# Patient Record
Sex: FEMALE | Race: WHITE | Employment: FULL TIME | URBAN - METROPOLITAN AREA
[De-identification: names, ages, dates, MRNs, and addresses within clinical notes are randomized per-mention and may not be internally consistent; named-entity substitution may affect disease eponyms.]

---

## 2017-04-18 ENCOUNTER — HOSPITAL ENCOUNTER (OUTPATIENT)
Dept: MAMMOGRAPHY | Age: 62
Discharge: HOME | End: 2017-04-18
Admitting: OBSTETRICS & GYNECOLOGY

## 2017-04-18 DIAGNOSIS — Z12.31 ENCOUNTER FOR SCREENING MAMMOGRAM FOR BREAST CANCER: ICD-10-CM

## 2022-01-17 NOTE — PROGRESS NOTES
1/17/22 @ 2224 Pt verbalizes understanding of PAT instructions. Pt had PCR test 1/16/22 at [address] .   She was informed to bring copy DOS.  Quinton Fleischer

## 2022-01-17 NOTE — PROGRESS NOTES
[address] patients having surgery or anesthesia are required to be Covid tested OR to have been vaccinated at least 14 days prior to your procedure. It is very important to return our call to [phone] and notify the staff of your last vaccination date otherwise you will be required to complete Covid PCR test within the 5-6 days prior to surgery & quarantine. The results will need to be faxed to PreAdmission Testing at [phone]. PRIOR TO PROCEDURE DATE:        1. PLEASE FOLLOW ANY  GUIDELINES/ INSTRUCTIONS PRIOR TO YOUR PROCEDURE AS ADVISED BY YOUR SURGEON. 2. Arrange for someone to drive you home and be with you for the first 24 hours after discharge for your safety after your procedure for which you received sedation. Ensure it is someone we can share information with regarding your discharge. 3. You must contact your surgeon for instructions IF:   You are taking any blood thinners, aspirin, anti-inflammatory or vitamin E.   There is a change in your physical condition such as a cold, fever, rash, cuts, sores or any other infection, especially near your surgical site. 4. Do not drink alcohol the day before or day of your procedure. 5. A Pre-op History and Physical for surgery MUST be completed by your Physician or Urgent Care within 30 days of your procedure date. Please bring a copy with you on the day of your procedure and along with any other testing performed. THE DAY OF YOUR PROCEDURE:  1. Follow instructions for ARRIVAL TIME as DIRECTED BY YOUR SURGEON. 2. Enter the MAIN entrance from [employer] and follow the signs to the free [employer] or [employer] parking (offered free of charge 6am-5pm). 3. Enter the Main Entrance of the hospital (do not enter from the lower level of the parking garage). Upon entrance, check in with the  at the main desk on your left. If no one is available at the desk, proceed into the [facility] Waiting Room and go through the door directly into the [facility]. There is a Check-in desk ACROSS from Room 5 (marked with a sign hanging from the ceiling). The phone number for the surgery center is [phone]. 4. Please call [phone] option #2 option #2 if you have not been preregistered yet. On the day of your procedure bring your insurance card and photo ID. You will be registered at your bedside once brought back to your room. 5. DO NOT EAT ANYTHING eight hours prior to your arrival for surgery. May have 8 ounces of water 4 hours prior to your arrival for surgery. NOTE: ALL Gastric, Bariatric and Bowel surgery patients MUST follow their surgeon's instructions. 6. MEDICATIONS    Take the following medications with a SMALL sip of water: none   Bariatric patient's call surgeon if on diabetic medications as some need to be stopped 1 week preop   Use your usual dose of inhalers the morning of surgery. BRING your rescue inhaler with you to hospital.    Anesthesia does NOT want you to take insulin the morning of surgery. They will control your blood sugar while you are at the hospital. Please contact your ordering physician for instructions regarding your insulin the night before your procedure. If you have an insulin pump, please keep it set on basal rate. 7. Do not swallow water when brushing teeth. No gum, candy, mints or ice chips. Refrain from smoking or at least decrease the amount. 8. Dress in loose, comfortable clothing appropriate for redressing after your procedure. Do not wear jewelry (including body piercings), make-up (especially NO eye make-up), fingernail polish (NO toenail polish if foot/leg surgery), lotion, powders or metal hairclips. 9. Dentures, glasses, or contacts will need to be removed before your procedure.  Bring cases for your glasses, contacts, dentures, or hearing aids to protect them while you are in surgery. 10. If you use a CPAP, please bring it with you on the day of your procedure. 11. We recommend that valuable personal  belongings such as cash, cell phones, e-tablets or jewelry, be left at home during your stay. The hospital will not be responsible for valuables that are not secured in the hospital safe. However, if your insurance requires a co-pay, you may want to bring a method of payment, i.e. Check or credit card, if you wish to pay your co-pay the day of surgery. 12. If you are to stay overnight, you may bring a bag with personal items. Please have any large items you may need brought in by your family after your arrival to your hospital room. 15. If you have a Living Will or Durable Power of , please bring a copy on the day of your procedure. 15. With your permission, one family member may accompany you while you are being prepared for surgery. Once you are ready, additional family members may join you. HOW WE KEEP YOU SAFE and WORK TO PREVENT SURGICAL SITE INFECTIONS:  1. Health care workers should always check your ID bracelet to verify your name and birth date. You will be asked many times to state your name, date of birth, and allergies. 2. Health care workers should always clean their hands with soap or alcohol gel before providing care to you. It is okay to ask anyone if they cleaned their hands before they touch you. 3. You will be actively involved in verifying the type of procedure you are having and ensuring the correct surgical site. This will be confirmed multiple times prior to your procedure. Do NOT glenn your surgery site UNLESS instructed to by your surgeon. 4. Do not shave or wax for 72 hours prior to procedure near your operative site. Shaving with a razor can irritate your skin and make it easier to develop an infection.  On the day of your procedure, any hair that needs to be removed near the surgical site will be clipped by a healthcare worker using a special clippers designed to avoid skin irritation. 5. When you are in the operating room, your surgical site will be cleansed with a special soap, and in most cases, you will be given an antibiotic before the surgery begins. What to expect AFTER YOUR PROCEDURE:  1. Immediately following your procedure, your will be taken to the PACU for the first phase of your recovery. Your nurse will help you recover from any potential side effects of anesthesia, such as extreme drowsiness, changes in your vital signs or breathing patterns. Nausea, headache, muscle aches, or sore throat may also occur after anesthesia. Your nurse will help you manage these potential side effects. 2. For comfort and safety, arrange to have someone at home with you for the first 24 hours after discharge. 3. You and your family will be given written instructions about your diet, activity, dressing care, medications, and return visits. 4. Once at home, should issues with nausea, pain, or bleeding occur, or should you notice any signs of infection, you should call your surgeon. 5. Always clean your hands before and after caring for your wound. Do not let your family touch your surgery site without cleaning their hands. 6. Narcotic pain medications can cause significant constipation. You may want to add a stool softener to your postoperative medication schedule or speak to your surgeon on how best to manage this SIDE EFFECT. SPECIAL INSTRUCTIONS none    Thank you for allowing us to care for you. We strive to exceed your expectations in the delivery of care and service provided to you and your family. If you need to contact the [place] [address] staff for any reason, please call us at [phone]    Instructions reviewed with patient during preadmission testing phone interview.   Estefanía Marinelli RN.1/17/2022 .1:38 PM      ADDITIONAL EDUCATIONAL INFORMATION REVIEWED PER PHONE WITH YOU AND/OR YOUR FAMILY:  Yes Hibiclens® Bathing Instructions   Yes Antibacterial Soap

## 2022-01-20 ENCOUNTER — ANESTHESIA EVENT (OUTPATIENT)
Dept: OPERATING ROOM | Age: 67
End: 2022-01-20
Payer: MEDICARE

## 2022-01-20 NOTE — PROGRESS NOTES
Place patient label insidbox (if no patient label, complete below)  Name:  :  MR#:   Michelle Betancourt / PROCEDURE  I (we), Loi Kenneth  (Patient Name) authorize DR. Sophia Fragoso  (Provider / Jerry Yap) and/or such assistants as may be selected by him/her, to perform the following operation/procedure(s): RIGHT SUBTALAR JOINT ARTHRODESIS, THOMPSON PROCEDURE, TALAR NAVICULAR CUNEIFORM ARTHRODESIS, CALCANEAL OSTEOTOMY, MONDRAGON PROCEDURE, OPEN REDUCTION AND INTERNAL FIXATION ;   RIGHT OPEN GASTROCNEMIUS RECESSION            Note: If unable to obtain consent prior to an emergent procedure, document the emergent reason in the medical record. This procedure has been explained to my (our) satisfaction and included in the explanation was:  A) The intended benefit, nature, and extent of the procedure to be performed;  B) The significant risks involved and the probability of success;  C) Alternative procedures and methods of treatment;  D) The dangers and probable consequences of such alternatives (including no procedure or treatment); E) The expected consequences of the procedure on my future health;  F) Whether other qualified individuals would be performing important surgical tasks and/or whether  would be present to advise or support the procedure. I (we) understand that there are other risks of infection and other serious complications in the pre-operative/procedural and postoperative/procedural stages of my (our) care. I (we) have asked all of the questions which I (we) thought were important in deciding whether or not to undergo treatment or diagnosis. These questions have been answered to my (our) satisfaction. I (we) understand that no assurance can be given that the procedure will be a success, and no guarantee or warranty of success has been given to me (us).     1. It has been explained to me (us) that during the course of the operation/procedure, unforeseen conditions may be revealed that necessitate extension of the original procedure(s) or different procedure(s) than those set forth in Paragraph 1. I (we) authorize and request that the above-named physician, his/her assistants or his/her designees, perform procedures as necessary and desirable if deemed to be in my (our) best interest.     Revised 8/2/2021                                                                          Page 1 of 2       2. I acknowledge that health care personnel may be observing this procedure for the purpose of medical education or other specified purposes as may be necessary as requested and/or approved by my (our) physician. 3. I (we) consent to the disposal by the hospital Pathologist of the removed tissue, parts or organs in accordance with hospital policy. 4. I do ____ do not ____ consent to the use of a local infiltration pain blocking agent that will be used by my provider/surgical provider to help alleviate pain during my procedure. 5. I do ____ do not ____ consent to an emergent blood transfusion in the case of a life-threatening situation that requires blood components to be administered. This consent is valid for 24 hours from the beginning of the procedure. 6. This patient does ____ or does not ____ currently have a DNR status/order. If DNR order is in place, obtain Addendum to the Surgical Consent for ALL Patients with a DNR Order to address lydia-operative status for limited intervention or DNR suspension.      7. I have read and fully understand the above Consent for Operation/Procedure and that all blanks were completed before I signed the consent.   _____________________________       _____________________      ____/____am/pm  Signature of Patient or legal representative      Printed Name / Relationship            Date / Time   ____________________________       _____________________      ____/____am/pm  Witness to Signature Printed Name                    Date / Time     If patient is unable to sign or is a minor, complete the following)  Patient is a minor, ____ years of age, or unable to sign because:   ______________________________________________________________________________________________    Valdivia Saliva If a phone consent is obtained, consent will be documented by using two health care professionals, each affirming that the consenting party has no questions and gives consent for the procedure discussed with the physician/provider.   _____________________          ____________________       _____/_____am/pm   2nd witness to phone consent        Printed name           Date / Time    Informed Consent:  I have provided the explanation described above in section 1 to the patient and/or legal representative.  I have provided the patient and/or legal representative with an opportunity to ask any questions about the proposed operation/procedure.   ___________________________          ____________________         ____/____am/pm  Provider / Proceduralist                            Printed name            Date / Time  Revised 8/2/2021                                                                      Page 2 of 2

## 2022-01-21 ENCOUNTER — ANESTHESIA (OUTPATIENT)
Dept: OPERATING ROOM | Age: 67
End: 2022-01-21
Payer: MEDICARE

## 2022-01-27 ENCOUNTER — HOSPITAL ENCOUNTER (OUTPATIENT)
Age: 67
Setting detail: OBSERVATION
Discharge: ANOTHER HOSPITAL | End: 2022-01-28
Attending: PODIATRIST | Admitting: PODIATRIST
Payer: MEDICARE

## 2022-01-27 ENCOUNTER — APPOINTMENT (OUTPATIENT)
Dept: GENERAL RADIOLOGY | Age: 67
End: 2022-01-27
Attending: PODIATRIST
Payer: MEDICARE

## 2022-01-27 VITALS
TEMPERATURE: 96.3 F | SYSTOLIC BLOOD PRESSURE: 112 MMHG | DIASTOLIC BLOOD PRESSURE: 59 MMHG | RESPIRATION RATE: 12 BRPM | OXYGEN SATURATION: 100 %

## 2022-01-27 DIAGNOSIS — G89.18 POST-OP PAIN: Primary | ICD-10-CM

## 2022-01-27 PROBLEM — R26.2 AMBULATORY DYSFUNCTION: Status: ACTIVE | Noted: 2022-01-27

## 2022-01-27 PROCEDURE — 3700000001 HC ADD 15 MINUTES (ANESTHESIA): Performed by: PODIATRIST

## 2022-01-27 PROCEDURE — C1894 INTRO/SHEATH, NON-LASER: HCPCS | Performed by: PODIATRIST

## 2022-01-27 PROCEDURE — 2580000003 HC RX 258: Performed by: ANESTHESIOLOGY

## 2022-01-27 PROCEDURE — 64447 NJX AA&/STRD FEMORAL NRV IMG: CPT | Performed by: ANESTHESIOLOGY

## 2022-01-27 PROCEDURE — 6360000002 HC RX W HCPCS: Performed by: PODIATRIST

## 2022-01-27 PROCEDURE — 6360000002 HC RX W HCPCS: Performed by: ANESTHESIOLOGY

## 2022-01-27 PROCEDURE — 2580000003 HC RX 258: Performed by: PODIATRIST

## 2022-01-27 PROCEDURE — 2500000003 HC RX 250 WO HCPCS: Performed by: PODIATRIST

## 2022-01-27 PROCEDURE — A4217 STERILE WATER/SALINE, 500 ML: HCPCS | Performed by: PODIATRIST

## 2022-01-27 PROCEDURE — 3600000014 HC SURGERY LEVEL 4 ADDTL 15MIN: Performed by: PODIATRIST

## 2022-01-27 PROCEDURE — 6370000000 HC RX 637 (ALT 250 FOR IP): Performed by: PODIATRIST

## 2022-01-27 PROCEDURE — 2720000010 HC SURG SUPPLY STERILE: Performed by: PODIATRIST

## 2022-01-27 PROCEDURE — C1889 IMPLANT/INSERT DEVICE, NOC: HCPCS | Performed by: PODIATRIST

## 2022-01-27 PROCEDURE — 2500000003 HC RX 250 WO HCPCS: Performed by: NURSE ANESTHETIST, CERTIFIED REGISTERED

## 2022-01-27 PROCEDURE — 7100000001 HC PACU RECOVERY - ADDTL 15 MIN: Performed by: PODIATRIST

## 2022-01-27 PROCEDURE — 2500000003 HC RX 250 WO HCPCS: Performed by: ANESTHESIOLOGY

## 2022-01-27 PROCEDURE — 2709999900 HC NON-CHARGEABLE SUPPLY: Performed by: PODIATRIST

## 2022-01-27 PROCEDURE — 3600000004 HC SURGERY LEVEL 4 BASE: Performed by: PODIATRIST

## 2022-01-27 PROCEDURE — 7100000000 HC PACU RECOVERY - FIRST 15 MIN: Performed by: PODIATRIST

## 2022-01-27 PROCEDURE — C1769 GUIDE WIRE: HCPCS | Performed by: PODIATRIST

## 2022-01-27 PROCEDURE — 64445 NJX AA&/STRD SCIATIC NRV IMG: CPT | Performed by: ANESTHESIOLOGY

## 2022-01-27 PROCEDURE — C1713 ANCHOR/SCREW BN/BN,TIS/BN: HCPCS | Performed by: PODIATRIST

## 2022-01-27 PROCEDURE — 3700000000 HC ANESTHESIA ATTENDED CARE: Performed by: PODIATRIST

## 2022-01-27 PROCEDURE — 73630 X-RAY EXAM OF FOOT: CPT

## 2022-01-27 PROCEDURE — C1762 CONN TISS, HUMAN(INC FASCIA): HCPCS | Performed by: PODIATRIST

## 2022-01-27 PROCEDURE — 6360000002 HC RX W HCPCS: Performed by: NURSE ANESTHETIST, CERTIFIED REGISTERED

## 2022-01-27 PROCEDURE — G0378 HOSPITAL OBSERVATION PER HR: HCPCS

## 2022-01-27 DEVICE — SCREW BNE L70MM DIA7MM XL HDLSS COMPR FULL THRD: Type: IMPLANTABLE DEVICE | Site: FOOT | Status: FUNCTIONAL

## 2022-01-27 DEVICE — ALLOSYNC DBM GEL, 5CC SYRINGE
Type: IMPLANTABLE DEVICE | Site: FOOT | Status: FUNCTIONAL
Brand: ARTHREX

## 2022-01-27 DEVICE — IMPLANTABLE DEVICE: Type: IMPLANTABLE DEVICE | Site: FOOT | Status: FUNCTIONAL

## 2022-01-27 DEVICE — STAPLE BONE FIX W20XL20MM NIT COMPR W/ INSTRMT LO PROF FOR: Type: IMPLANTABLE DEVICE | Site: FOOT | Status: FUNCTIONAL

## 2022-01-27 DEVICE — ALLOSYNC CB DBM PUTTY, 5CC VIAL
Type: IMPLANTABLE DEVICE | Site: FOOT | Status: FUNCTIONAL
Brand: ARTHREX

## 2022-01-27 RX ORDER — SUCCINYLCHOLINE CHLORIDE 20 MG/ML
INJECTION INTRAMUSCULAR; INTRAVENOUS PRN
Status: DISCONTINUED | OUTPATIENT
Start: 2022-01-27 | End: 2022-01-27 | Stop reason: SDUPTHER

## 2022-01-27 RX ORDER — POLYETHYLENE GLYCOL 3350 17 G/17G
17 POWDER, FOR SOLUTION ORAL DAILY PRN
Status: DISCONTINUED | OUTPATIENT
Start: 2022-01-27 | End: 2022-01-28 | Stop reason: HOSPADM

## 2022-01-27 RX ORDER — SODIUM CHLORIDE, SODIUM LACTATE, POTASSIUM CHLORIDE, CALCIUM CHLORIDE 600; 310; 30; 20 MG/100ML; MG/100ML; MG/100ML; MG/100ML
INJECTION, SOLUTION INTRAVENOUS CONTINUOUS
Status: DISCONTINUED | OUTPATIENT
Start: 2022-01-27 | End: 2022-01-28 | Stop reason: HOSPADM

## 2022-01-27 RX ORDER — MIDAZOLAM HYDROCHLORIDE 1 MG/ML
INJECTION INTRAMUSCULAR; INTRAVENOUS PRN
Status: DISCONTINUED | OUTPATIENT
Start: 2022-01-27 | End: 2022-01-27 | Stop reason: SDUPTHER

## 2022-01-27 RX ORDER — MEPERIDINE HYDROCHLORIDE 25 MG/ML
12.5 INJECTION INTRAMUSCULAR; INTRAVENOUS; SUBCUTANEOUS EVERY 5 MIN PRN
Status: DISCONTINUED | OUTPATIENT
Start: 2022-01-27 | End: 2022-01-27 | Stop reason: HOSPADM

## 2022-01-27 RX ORDER — SODIUM CHLORIDE 9 MG/ML
25 INJECTION, SOLUTION INTRAVENOUS PRN
Status: DISCONTINUED | OUTPATIENT
Start: 2022-01-27 | End: 2022-01-28 | Stop reason: HOSPADM

## 2022-01-27 RX ORDER — ONDANSETRON 2 MG/ML
4 INJECTION INTRAMUSCULAR; INTRAVENOUS EVERY 6 HOURS PRN
Status: DISCONTINUED | OUTPATIENT
Start: 2022-01-27 | End: 2022-01-28 | Stop reason: HOSPADM

## 2022-01-27 RX ORDER — ONDANSETRON 2 MG/ML
4 INJECTION INTRAMUSCULAR; INTRAVENOUS
Status: DISCONTINUED | OUTPATIENT
Start: 2022-01-27 | End: 2022-01-27 | Stop reason: HOSPADM

## 2022-01-27 RX ORDER — FENTANYL CITRATE 50 UG/ML
INJECTION, SOLUTION INTRAMUSCULAR; INTRAVENOUS PRN
Status: DISCONTINUED | OUTPATIENT
Start: 2022-01-27 | End: 2022-01-27 | Stop reason: SDUPTHER

## 2022-01-27 RX ORDER — GLYCOPYRROLATE 0.2 MG/ML
INJECTION INTRAMUSCULAR; INTRAVENOUS PRN
Status: DISCONTINUED | OUTPATIENT
Start: 2022-01-27 | End: 2022-01-27

## 2022-01-27 RX ORDER — ONDANSETRON 4 MG/1
4 TABLET, ORALLY DISINTEGRATING ORAL EVERY 8 HOURS PRN
Status: DISCONTINUED | OUTPATIENT
Start: 2022-01-27 | End: 2022-01-28 | Stop reason: HOSPADM

## 2022-01-27 RX ORDER — PROPOFOL 10 MG/ML
INJECTION, EMULSION INTRAVENOUS PRN
Status: DISCONTINUED | OUTPATIENT
Start: 2022-01-27 | End: 2022-01-27 | Stop reason: SDUPTHER

## 2022-01-27 RX ORDER — OXYCODONE HYDROCHLORIDE 5 MG/1
5 TABLET ORAL EVERY 4 HOURS PRN
Status: DISCONTINUED | OUTPATIENT
Start: 2022-01-27 | End: 2022-01-28 | Stop reason: HOSPADM

## 2022-01-27 RX ORDER — DEXAMETHASONE SODIUM PHOSPHATE 4 MG/ML
INJECTION, SOLUTION INTRA-ARTICULAR; INTRALESIONAL; INTRAMUSCULAR; INTRAVENOUS; SOFT TISSUE PRN
Status: DISCONTINUED | OUTPATIENT
Start: 2022-01-27 | End: 2022-01-27 | Stop reason: SDUPTHER

## 2022-01-27 RX ORDER — KETOROLAC TROMETHAMINE 15 MG/ML
INJECTION, SOLUTION INTRAMUSCULAR; INTRAVENOUS PRN
Status: DISCONTINUED | OUTPATIENT
Start: 2022-01-27 | End: 2022-01-27 | Stop reason: SDUPTHER

## 2022-01-27 RX ORDER — FENTANYL CITRATE 50 UG/ML
25 INJECTION, SOLUTION INTRAMUSCULAR; INTRAVENOUS EVERY 5 MIN PRN
Status: DISCONTINUED | OUTPATIENT
Start: 2022-01-27 | End: 2022-01-27 | Stop reason: HOSPADM

## 2022-01-27 RX ORDER — LIDOCAINE HYDROCHLORIDE 20 MG/ML
INJECTION, SOLUTION INFILTRATION; PERINEURAL PRN
Status: DISCONTINUED | OUTPATIENT
Start: 2022-01-27 | End: 2022-01-27 | Stop reason: SDUPTHER

## 2022-01-27 RX ORDER — OXYCODONE HYDROCHLORIDE AND ACETAMINOPHEN 5; 325 MG/1; MG/1
1 TABLET ORAL
Status: DISCONTINUED | OUTPATIENT
Start: 2022-01-27 | End: 2022-01-27 | Stop reason: HOSPADM

## 2022-01-27 RX ORDER — HYDRALAZINE HYDROCHLORIDE 20 MG/ML
5 INJECTION INTRAMUSCULAR; INTRAVENOUS EVERY 10 MIN PRN
Status: DISCONTINUED | OUTPATIENT
Start: 2022-01-27 | End: 2022-01-27 | Stop reason: HOSPADM

## 2022-01-27 RX ORDER — SODIUM CHLORIDE 0.9 % (FLUSH) 0.9 %
5-40 SYRINGE (ML) INJECTION PRN
Status: DISCONTINUED | OUTPATIENT
Start: 2022-01-27 | End: 2022-01-28 | Stop reason: HOSPADM

## 2022-01-27 RX ORDER — ONDANSETRON 2 MG/ML
INJECTION INTRAMUSCULAR; INTRAVENOUS PRN
Status: DISCONTINUED | OUTPATIENT
Start: 2022-01-27 | End: 2022-01-27 | Stop reason: SDUPTHER

## 2022-01-27 RX ORDER — BUPIVACAINE HYDROCHLORIDE 5 MG/ML
INJECTION, SOLUTION EPIDURAL; INTRACAUDAL PRN
Status: DISCONTINUED | OUTPATIENT
Start: 2022-01-27 | End: 2022-01-27 | Stop reason: SDUPTHER

## 2022-01-27 RX ORDER — LABETALOL HYDROCHLORIDE 5 MG/ML
5 INJECTION, SOLUTION INTRAVENOUS EVERY 10 MIN PRN
Status: DISCONTINUED | OUTPATIENT
Start: 2022-01-27 | End: 2022-01-27 | Stop reason: HOSPADM

## 2022-01-27 RX ORDER — GLYCOPYRROLATE 0.2 MG/ML
INJECTION INTRAMUSCULAR; INTRAVENOUS PRN
Status: DISCONTINUED | OUTPATIENT
Start: 2022-01-27 | End: 2022-01-27 | Stop reason: SDUPTHER

## 2022-01-27 RX ORDER — ROPIVACAINE HYDROCHLORIDE 5 MG/ML
INJECTION, SOLUTION EPIDURAL; INFILTRATION; PERINEURAL PRN
Status: DISCONTINUED | OUTPATIENT
Start: 2022-01-27 | End: 2022-01-27 | Stop reason: SDUPTHER

## 2022-01-27 RX ORDER — KETAMINE HYDROCHLORIDE 50 MG/ML
INJECTION, SOLUTION, CONCENTRATE INTRAMUSCULAR; INTRAVENOUS PRN
Status: DISCONTINUED | OUTPATIENT
Start: 2022-01-27 | End: 2022-01-27 | Stop reason: SDUPTHER

## 2022-01-27 RX ORDER — SODIUM CHLORIDE 0.9 % (FLUSH) 0.9 %
5-40 SYRINGE (ML) INJECTION EVERY 12 HOURS SCHEDULED
Status: DISCONTINUED | OUTPATIENT
Start: 2022-01-27 | End: 2022-01-28 | Stop reason: HOSPADM

## 2022-01-27 RX ORDER — ACETAMINOPHEN 650 MG/1
650 SUPPOSITORY RECTAL EVERY 6 HOURS PRN
Status: DISCONTINUED | OUTPATIENT
Start: 2022-01-27 | End: 2022-01-28 | Stop reason: HOSPADM

## 2022-01-27 RX ORDER — BUPIVACAINE HYDROCHLORIDE 5 MG/ML
INJECTION, SOLUTION EPIDURAL; INTRACAUDAL PRN
Status: DISCONTINUED | OUTPATIENT
Start: 2022-01-27 | End: 2022-01-27 | Stop reason: HOSPADM

## 2022-01-27 RX ORDER — PROMETHAZINE HYDROCHLORIDE 25 MG/ML
6.25 INJECTION, SOLUTION INTRAMUSCULAR; INTRAVENOUS
Status: DISCONTINUED | OUTPATIENT
Start: 2022-01-27 | End: 2022-01-27 | Stop reason: HOSPADM

## 2022-01-27 RX ORDER — ROCURONIUM BROMIDE 10 MG/ML
INJECTION, SOLUTION INTRAVENOUS PRN
Status: DISCONTINUED | OUTPATIENT
Start: 2022-01-27 | End: 2022-01-27 | Stop reason: SDUPTHER

## 2022-01-27 RX ORDER — CITALOPRAM 20 MG/1
20 TABLET ORAL DAILY
Status: DISCONTINUED | OUTPATIENT
Start: 2022-01-28 | End: 2022-01-28 | Stop reason: HOSPADM

## 2022-01-27 RX ORDER — ACETAMINOPHEN 325 MG/1
650 TABLET ORAL EVERY 6 HOURS PRN
Status: DISCONTINUED | OUTPATIENT
Start: 2022-01-27 | End: 2022-01-28 | Stop reason: HOSPADM

## 2022-01-27 RX ORDER — OXYCODONE HYDROCHLORIDE 5 MG/1
10 TABLET ORAL EVERY 4 HOURS PRN
Status: DISCONTINUED | OUTPATIENT
Start: 2022-01-27 | End: 2022-01-28 | Stop reason: HOSPADM

## 2022-01-27 RX ORDER — MIDAZOLAM HYDROCHLORIDE 1 MG/ML
INJECTION INTRAMUSCULAR; INTRAVENOUS
Status: COMPLETED
Start: 2022-01-27 | End: 2022-01-27

## 2022-01-27 RX ORDER — BUPIVACAINE HYDROCHLORIDE 5 MG/ML
INJECTION, SOLUTION EPIDURAL; INTRACAUDAL
Status: COMPLETED
Start: 2022-01-27 | End: 2022-01-27

## 2022-01-27 RX ORDER — POVIDONE-IODINE 10 MG/G
OINTMENT TOPICAL PRN
Status: DISCONTINUED | OUTPATIENT
Start: 2022-01-27 | End: 2022-01-27 | Stop reason: HOSPADM

## 2022-01-27 RX ADMIN — FENTANYL CITRATE 25 MCG: 50 INJECTION, SOLUTION INTRAMUSCULAR; INTRAVENOUS at 11:13

## 2022-01-27 RX ADMIN — GLYCOPYRROLATE 0.4 MG: 0.2 INJECTION INTRAMUSCULAR; INTRAVENOUS at 11:10

## 2022-01-27 RX ADMIN — MIDAZOLAM HYDROCHLORIDE 2 MG: 1 INJECTION INTRAMUSCULAR; INTRAVENOUS at 11:05

## 2022-01-27 RX ADMIN — PHENYLEPHRINE HYDROCHLORIDE 50 MCG: 10 INJECTION, SOLUTION INTRAMUSCULAR; INTRAVENOUS; SUBCUTANEOUS at 11:11

## 2022-01-27 RX ADMIN — SODIUM CHLORIDE, SODIUM LACTATE, POTASSIUM CHLORIDE, AND CALCIUM CHLORIDE: .6; .31; .03; .02 INJECTION, SOLUTION INTRAVENOUS at 10:10

## 2022-01-27 RX ADMIN — PHENYLEPHRINE HYDROCHLORIDE 50 MCG: 10 INJECTION, SOLUTION INTRAMUSCULAR; INTRAVENOUS; SUBCUTANEOUS at 12:09

## 2022-01-27 RX ADMIN — PROPOFOL 50 MG: 10 INJECTION, EMULSION INTRAVENOUS at 11:13

## 2022-01-27 RX ADMIN — PHENYLEPHRINE HYDROCHLORIDE 50 MCG: 10 INJECTION, SOLUTION INTRAMUSCULAR; INTRAVENOUS; SUBCUTANEOUS at 13:35

## 2022-01-27 RX ADMIN — KETAMINE HYDROCHLORIDE 10 MG: 50 INJECTION, SOLUTION INTRAMUSCULAR; INTRAVENOUS at 12:18

## 2022-01-27 RX ADMIN — LIDOCAINE HYDROCHLORIDE 100 MG: 20 INJECTION, SOLUTION INFILTRATION; PERINEURAL at 11:11

## 2022-01-27 RX ADMIN — ROCURONIUM BROMIDE 10 MG: 10 INJECTION INTRAVENOUS at 11:12

## 2022-01-27 RX ADMIN — SODIUM CHLORIDE, PRESERVATIVE FREE 10 ML: 5 INJECTION INTRAVENOUS at 20:43

## 2022-01-27 RX ADMIN — ONDANSETRON 4 MG: 2 INJECTION INTRAMUSCULAR; INTRAVENOUS at 15:53

## 2022-01-27 RX ADMIN — KETAMINE HYDROCHLORIDE 10 MG: 50 INJECTION, SOLUTION INTRAMUSCULAR; INTRAVENOUS at 11:31

## 2022-01-27 RX ADMIN — FENTANYL CITRATE 25 MCG: 50 INJECTION, SOLUTION INTRAMUSCULAR; INTRAVENOUS at 11:33

## 2022-01-27 RX ADMIN — ONDANSETRON 4 MG: 2 INJECTION INTRAMUSCULAR; INTRAVENOUS at 11:24

## 2022-01-27 RX ADMIN — FENTANYL CITRATE 25 MCG: 50 INJECTION, SOLUTION INTRAMUSCULAR; INTRAVENOUS at 13:50

## 2022-01-27 RX ADMIN — KETOROLAC TROMETHAMINE 15 MG: 15 INJECTION, SOLUTION INTRAMUSCULAR; INTRAVENOUS at 13:54

## 2022-01-27 RX ADMIN — FENTANYL CITRATE 25 MCG: 50 INJECTION, SOLUTION INTRAMUSCULAR; INTRAVENOUS at 13:36

## 2022-01-27 RX ADMIN — SUCCINYLCHOLINE CHLORIDE 100 MG: 20 INJECTION, SOLUTION INTRAMUSCULAR; INTRAVENOUS; PARENTERAL at 11:14

## 2022-01-27 RX ADMIN — KETAMINE HYDROCHLORIDE 10 MG: 50 INJECTION, SOLUTION INTRAMUSCULAR; INTRAVENOUS at 13:00

## 2022-01-27 RX ADMIN — DEXAMETHASONE SODIUM PHOSPHATE 4 MG: 4 INJECTION, SOLUTION INTRAMUSCULAR; INTRAVENOUS at 11:20

## 2022-01-27 RX ADMIN — ROPIVACAINE HYDROCHLORIDE 20 ML: 5 INJECTION, SOLUTION EPIDURAL; INFILTRATION; PERINEURAL at 10:49

## 2022-01-27 RX ADMIN — MIDAZOLAM HYDROCHLORIDE 2 MG: 2 INJECTION, SOLUTION INTRAMUSCULAR; INTRAVENOUS at 10:43

## 2022-01-27 RX ADMIN — HYDROMORPHONE HYDROCHLORIDE 0.5 MG: 1 INJECTION, SOLUTION INTRAMUSCULAR; INTRAVENOUS; SUBCUTANEOUS at 15:53

## 2022-01-27 RX ADMIN — FAMOTIDINE 20 MG: 10 INJECTION, SOLUTION INTRAVENOUS at 10:56

## 2022-01-27 RX ADMIN — BUPIVACAINE HYDROCHLORIDE 30 ML: 5 INJECTION, SOLUTION EPIDURAL; INTRACAUDAL; PERINEURAL at 10:48

## 2022-01-27 ASSESSMENT — PULMONARY FUNCTION TESTS
PIF_VALUE: 21
PIF_VALUE: 20
PIF_VALUE: 21
PIF_VALUE: 31
PIF_VALUE: 21
PIF_VALUE: 22
PIF_VALUE: 21
PIF_VALUE: 20
PIF_VALUE: 12
PIF_VALUE: 21
PIF_VALUE: 20
PIF_VALUE: 20
PIF_VALUE: 21
PIF_VALUE: 20
PIF_VALUE: 5
PIF_VALUE: 22
PIF_VALUE: 21
PIF_VALUE: 20
PIF_VALUE: 22
PIF_VALUE: 12
PIF_VALUE: 21
PIF_VALUE: 3
PIF_VALUE: 20
PIF_VALUE: 23
PIF_VALUE: 2
PIF_VALUE: 19
PIF_VALUE: 20
PIF_VALUE: 21
PIF_VALUE: 19
PIF_VALUE: 22
PIF_VALUE: 21
PIF_VALUE: 21
PIF_VALUE: 20
PIF_VALUE: 2
PIF_VALUE: 21
PIF_VALUE: 22
PIF_VALUE: 21
PIF_VALUE: 2
PIF_VALUE: 12
PIF_VALUE: 2
PIF_VALUE: 21
PIF_VALUE: 12
PIF_VALUE: 21
PIF_VALUE: 21
PIF_VALUE: 12
PIF_VALUE: 21
PIF_VALUE: 2
PIF_VALUE: 2
PIF_VALUE: 21
PIF_VALUE: 22
PIF_VALUE: 21
PIF_VALUE: 22
PIF_VALUE: 2
PIF_VALUE: 21
PIF_VALUE: 21
PIF_VALUE: 12
PIF_VALUE: 3
PIF_VALUE: 21
PIF_VALUE: 18
PIF_VALUE: 2
PIF_VALUE: 4
PIF_VALUE: 21
PIF_VALUE: 20
PIF_VALUE: 2
PIF_VALUE: 1
PIF_VALUE: 21
PIF_VALUE: 2
PIF_VALUE: 21
PIF_VALUE: 20
PIF_VALUE: 22
PIF_VALUE: 20
PIF_VALUE: 21
PIF_VALUE: 20
PIF_VALUE: 3
PIF_VALUE: 22
PIF_VALUE: 12
PIF_VALUE: 21
PIF_VALUE: 20
PIF_VALUE: 21
PIF_VALUE: 20
PIF_VALUE: 21
PIF_VALUE: 20
PIF_VALUE: 21
PIF_VALUE: 19
PIF_VALUE: 20
PIF_VALUE: 21
PIF_VALUE: 21
PIF_VALUE: 22
PIF_VALUE: 20
PIF_VALUE: 21
PIF_VALUE: 21
PIF_VALUE: 22
PIF_VALUE: 21
PIF_VALUE: 4
PIF_VALUE: 2
PIF_VALUE: 2
PIF_VALUE: 21
PIF_VALUE: 20
PIF_VALUE: 2
PIF_VALUE: 12
PIF_VALUE: 2
PIF_VALUE: 22
PIF_VALUE: 21
PIF_VALUE: 21
PIF_VALUE: 2
PIF_VALUE: 21
PIF_VALUE: 21
PIF_VALUE: 2
PIF_VALUE: 20
PIF_VALUE: 21
PIF_VALUE: 12
PIF_VALUE: 21
PIF_VALUE: 12
PIF_VALUE: 20
PIF_VALUE: 21
PIF_VALUE: 21
PIF_VALUE: 2
PIF_VALUE: 21
PIF_VALUE: 2
PIF_VALUE: 22
PIF_VALUE: 20
PIF_VALUE: 19
PIF_VALUE: 1
PIF_VALUE: 12
PIF_VALUE: 20
PIF_VALUE: 21
PIF_VALUE: 20
PIF_VALUE: 21
PIF_VALUE: 21
PIF_VALUE: 20
PIF_VALUE: 2
PIF_VALUE: 2
PIF_VALUE: 12
PIF_VALUE: 21
PIF_VALUE: 21
PIF_VALUE: 17
PIF_VALUE: 21
PIF_VALUE: 22
PIF_VALUE: 22
PIF_VALUE: 20
PIF_VALUE: 12
PIF_VALUE: 21
PIF_VALUE: 2
PIF_VALUE: 20
PIF_VALUE: 22
PIF_VALUE: 21
PIF_VALUE: 22
PIF_VALUE: 20
PIF_VALUE: 21
PIF_VALUE: 22
PIF_VALUE: 5
PIF_VALUE: 12
PIF_VALUE: 21
PIF_VALUE: 21
PIF_VALUE: 20
PIF_VALUE: 21
PIF_VALUE: 21
PIF_VALUE: 20
PIF_VALUE: 21
PIF_VALUE: 21
PIF_VALUE: 25
PIF_VALUE: 21
PIF_VALUE: 21
PIF_VALUE: 2
PIF_VALUE: 3
PIF_VALUE: 20

## 2022-01-27 ASSESSMENT — PAIN DESCRIPTION - LOCATION: LOCATION: FOOT

## 2022-01-27 ASSESSMENT — PAIN DESCRIPTION - PROGRESSION: CLINICAL_PROGRESSION: GRADUALLY WORSENING

## 2022-01-27 ASSESSMENT — PAIN SCALES - GENERAL
PAINLEVEL_OUTOF10: 0
PAINLEVEL_OUTOF10: 2
PAINLEVEL_OUTOF10: 7
PAINLEVEL_OUTOF10: 0

## 2022-01-27 ASSESSMENT — PAIN DESCRIPTION - DESCRIPTORS
DESCRIPTORS: ACHING;BURNING
DESCRIPTORS: ACHING

## 2022-01-27 ASSESSMENT — PAIN DESCRIPTION - FREQUENCY: FREQUENCY: CONTINUOUS

## 2022-01-27 ASSESSMENT — PAIN - FUNCTIONAL ASSESSMENT
PAIN_FUNCTIONAL_ASSESSMENT: PREVENTS OR INTERFERES SOME ACTIVE ACTIVITIES AND ADLS
PAIN_FUNCTIONAL_ASSESSMENT: 0-10

## 2022-01-27 ASSESSMENT — PAIN DESCRIPTION - ONSET: ONSET: GRADUAL

## 2022-01-27 ASSESSMENT — PAIN DESCRIPTION - PAIN TYPE: TYPE: SURGICAL PAIN

## 2022-01-27 ASSESSMENT — PAIN DESCRIPTION - ORIENTATION: ORIENTATION: RIGHT

## 2022-01-27 NOTE — PROGRESS NOTES
PACU Transfer Note    PACU Transfer Note    Vitals:    01/27/22 1700   BP: (!) 146/81   Pulse: 81   Resp: 19   Temp: 97.5 °F (36.4 °C)   SpO2: 100%       In: 1600 [I.V.:1600]  Out: 150 [Urine:100]    Pain assessment:    Pain Level: 2    Report given to Receiving unit RN.    1/27/2022 5:31 PM

## 2022-01-27 NOTE — ANESTHESIA POSTPROCEDURE EVALUATION
Department of Anesthesiology  Postprocedure Note    Patient: Randal Bailey  MRN: [id number]  YOB: 1955  Date of evaluation: 1/27/2022  Time:  4:31 PM     Procedure Summary     Date: 01/27/22 Room / Location: [facility] [address]  / [facility]    Anesthesia Start: 1106 Anesthesia Stop: 2404    Procedures:       RIGHT SUBTALAR JOINT ARTHRODESIS, THOMPSON PROCEDURE, TALAR NAVICULAR CUNEIFORM ARTHRODESIS, CALCANEAL OSTEOTOMY, MONDRAGON PROCEDURE, OPEN REDUCTION AND INTERNAL FIXATION (Right )      RIGHT OPEN GASTROCNEMIUS RECESSION (Right )      . (Right ) Diagnosis:       Secondary osteoarthritis of ankle, right      Posterior tibial tendinitis of right leg      Major osseous defect, right ankle and foot      Subluxation of tarsometatarsal joint of right foot, initial encounter      Posterior tibial tendonitis, right      (osteoarthritis of ankle, right [M19.271], Posterior tibial tendinitis of right leg [M76.821] Tightened flexors / equimus. Osseous defects tarsal, Subluxation / Dislocation of midfoot)    Surgeons: Maria Luisa Kessler DPM Responsible Provider:     Anesthesia Type: general ASA Status: 2          Anesthesia Type: general    Waylon Phase I: Waylon Score: 4    Waylon Phase II:      Last vitals: Reviewed and per EMR flowsheets.        Anesthesia Post Evaluation    Patient location during evaluation: PACU  Patient participation: complete - patient participated  Level of consciousness: awake and alert  Airway patency: patent  Nausea & Vomiting: no nausea and no vomiting  Complications: no  Cardiovascular status: hemodynamically stable  Respiratory status: acceptable  Hydration status: euvolemic

## 2022-01-27 NOTE — H&P
[address]    [phone]    [facility] Same Day Surgery Update H & P  Department of General Surgery   Surgical Service   Pre-operative History and Physical  Last H & P within the last 30 days. DIAGNOSIS:   Secondary osteoarthritis of ankle, right [M19.271]  Posterior tibial tendinitis of right leg [M76.821]  Major osseous defect, right ankle and foot [M89.771]  Subluxation of tarsometatarsal joint of right foot, initial encounter [S93.321A]  Posterior tibial tendonitis, right [M96.834]    Procedure(s):  RIGHT SUBTALAR JOINT ARTHRODESIS, THOMPSON PROCEDURE, TALAR NAVICULAR CUNEIFORM ARTHRODESIS, CALCANEAL OSTEOTOMY, MONDRAGON PROCEDURE, OPEN REDUCTION AND INTERNAL FIXATION  RIGHT OPEN GASTROCNEMIUS RECESSION  . History obtained from: Patient interview and EHR     HISTORY OF PRESENT ILLNESS:   The patient is a 73 y.o. female with c/o right ankle/foot pain, swelling and limited ROM in the setting of right ankle/foot arthrosis. Their symptoms have been recalcitrant to conservative treatment and the patient presents today for the above procedure. Covid 19:  Patient denies fever, chills, worsening cough, or known exposure to Covid-19.       Past Medical History:        Diagnosis Date    Abnormal Pap smear     Depression      Past Surgical History:        Procedure Laterality Date     SECTION      COLPOSCOPY      HYSTERECTOMY  11    laparoscopic supracervical    TUBAL LIGATION       Past Social History:  Social History     Socioeconomic History    Marital status:      Spouse name: None    Number of children: None    Years of education: None    Highest education level: None   Occupational History    None   Tobacco Use    Smoking status: Never Smoker    Smokeless tobacco: None   Substance and Sexual Activity    Alcohol use: No    Drug use: No    Sexual activity: None   Other Topics Concern    None   Social History Narrative    None     Social Determinants of Health Financial Resource Strain:     Difficulty of Paying Living Expenses: Not on file   Food Insecurity:     Worried About Running Out of Food in the Last Year: Not on file    Luis of Food in the Last Year: Not on file   Transportation Needs:     Lack of Transportation (Medical): Not on file    Lack of Transportation (Non-Medical): Not on file   Physical Activity:     Days of Exercise per Week: Not on file    Minutes of Exercise per Session: Not on file   Stress:     Feeling of Stress : Not on file   Social Connections:     Frequency of Communication with Friends and Family: Not on file    Frequency of Social Gatherings with Friends and Family: Not on file    Attends Mandaen Services: Not on file    Active Member of [address] [place] [address] or Organizations: Not on file    Attends Club or Organization Meetings: Not on file    Marital Status: Not on file   Intimate Partner Violence:     Fear of Current or Ex-Partner: Not on file    Emotionally Abused: Not on file    Physically Abused: Not on file    Sexually Abused: Not on file   Housing Stability:     Unable to Pay for Housing in the Last Year: Not on file    Number of [place] in the Last Year: Not on file    Unstable Housing in the Last Year: Not on file         Medications Prior to Admission:      Prior to Admission medications    Medication Sig Start Date End Date Taking? Authorizing Provider   citalopram (CELEXA) 20 MG tablet Take 20 mg by mouth daily. Yes Historical Provider, MD   Aspirin-Acetaminophen-Caffeine (EXCEDRIN MIGRAINE PO) Take 2 tablets by mouth as needed. Takes PRN migranes     Historical Provider, MD         Allergies:  Patient has no known allergies.     PHYSICAL EXAM:      /72   Pulse 58   Temp 98 °F (36.7 °C)   Resp 12   Ht 5' 4\" (1.626 m)   Wt 190 lb (86.2 kg)   LMP 01/29/2011   SpO2 100%   BMI 32.61 kg/m²      Airway:  Airway patent with no audible stridor    Heart:  Regular rate and rhythm, No murmur noted    Lungs:  No increased work of breathing, good air exchange, clear to auscultation bilaterally, no crackles or wheezing    Abdomen:  Soft, non-distended, non-tender, no masses palpated    ASSESSMENT AND PLAN    Patient is a 73 y.o. female with above specified procedure planned. 1.  The patients history and physical was obtained and signed off by the pre-admission testing department. Patient seen and focused exam done today- no new changes since last physical exam on 12/29/21    2. Access to ancillary services are available per request of the provider.     Tanner Shell, DAYANARA - CNP     1/27/2022

## 2022-01-27 NOTE — DISCHARGE SUMMARY
Patient ID: Ruthe Figures      Patient's PCP: Nichole Sutton MD    Admit Date: 1/27/2022     Discharge Date:  1/28/2022    Length of Stay: 1 day    Admitting Physician: Mecca Lopez DPM    Discharge Physician: Solitario Avila DPM PGY3    Discharge Diagnoses:   Patient Active Problem List   Diagnosis    Ambulatory dysfunction       Hospital Course:  Patient is from Dr. Agnieszka Briggs private office. They were being treated conservatively for osteoarthritis, posterior tibial tendinitis of the right lower extremity. After failing all conservative treatments, the patient elected to proceed with surgical intervention. The patient was admitted to the hospital for post-operative pain control s/p subtalar joint arthrodesis, talonavicular joint arthrodesis, calcaneocuboid joint arthrodesis, open gastrocnemius recession of the right lower extremity, DOS 1/27/2022. During their stay, the patient worked with the physical therapy team who recommended that the patient go home upon discharge with physical and occupational therapy. Initially during her post-operative course, the patient's pain was controlled with IV pain medications. These were de-escalated over time. After obtaining adequate post-operative control with oral pain medications, the patient was discharged to home with physical condition and vital signs stable. The patient was discharged with prescriptions for oral pain medication, Percocet 5mg q4hr, Motrin, and Eliquis. The patient is to follow up with Dr. Akosua Pabon in his private office in one week. The patient is to remain non-weightbearing to the right LE with assistive walking devices. They are to keep the dressing clean, dry, and intact. All discharge instructions were discussed verbally with the patient as well as placed in their discharge instructions for reference.      Consults:   IP CONSULT TO HOSPITALIST  IP CONSULT TO SOCIAL WORK    Significant Diagnostic Studies:   XR FOOT RIGHT (MIN 3 VIEWS)   Final Result   1. Status post arthrodesis across the talocalcaneal, calcaneocuboid, and talonavicular joints. Treatments: IV hydration, analgesia: acetaminophen, Dilaudid and Oxycodone and surgery: listed below    Surgical Procedures: Procedure(s) (LRB):  RIGHT SUBTALAR JOINT ARTHRODESIS, THOMPSON PROCEDURE, TALAR NAVICULAR CUNEIFORM ARTHRODESIS, CALCANEAL OSTEOTOMY, MONDRAGON PROCEDURE, OPEN REDUCTION AND INTERNAL FIXATION (Right)  RIGHT OPEN GASTROCNEMIUS RECESSION (Right)  . (Right)    Inpatient PRN Medications: sodium chloride flush, 5-40 mL, PRN  sodium chloride, 25 mL, PRN  ondansetron, 4 mg, Q8H PRN   Or  ondansetron, 4 mg, Q6H PRN  polyethylene glycol, 17 g, Daily PRN  acetaminophen, 650 mg, Q6H PRN   Or  acetaminophen, 650 mg, Q6H PRN  oxyCODONE, 5 mg, Q4H PRN   Or  oxyCODONE, 10 mg, Q4H PRN  HYDROmorphone, 0.5 mg, Q3H PRN        Inpatient Scheduled Medications: sodium chloride flush, 5-40 mL, 2 times per day        Disposition: home    Discharged Condition: Stable    Follow Up: Podiatric Physician in one week    Discharge Medications:     Medication List      ASK your doctor about these medications    citalopram 20 MG tablet  Commonly known as: CELEXA     EXCEDRIN MIGRAINE PO             Activity: NON weightbearing to the surgical foot using walker    Diet: regular diet    Wound Care: Dressing with case is to be left clean, dry, and intact.  May apply ice to area for comfort and elevate      Time Spent on discharge is more than 30 minutes    Signed:  Ha Pascual DPM  Podiatric Resident PGY3  1/28/2022, 12:40 PM

## 2022-01-27 NOTE — PROGRESS NOTES
4 Eyes Admission Assessment     I agree as the admission nurse that 2 RN's have performed a thorough Head to Toe Skin Assessment on the patient. ALL assessment sites listed below have been assessed on admission. Areas assessed by both nurses:   [x]   Head, Face, and Ears   [x]   Shoulders, Back, and Chest  [x]   Arms, Elbows, and Hands   [x]   Coccyx, Sacrum, and Ischium  [x]   Legs, Feet, and Heels        Does the Patient have Skin Breakdown?   No         Dave Prevention initiated:  No   Wound Care Orders initiated:  No      [facility] nurse consulted for Pressure Injury (Stage 3,4, Unstageable, DTI, NWPT, and Complex wounds) or Dave score 18 or lower:  No      Nurse 1 eSignature: Electronically signed by Doron Clarke RN on 1/27/22 at 5:41 PM EST    **SHARE this note so that the co-signing nurse is able to place an eSignature**    Nurse 2 eSignature: Electronically signed by Iron Main RN on 1/27/22 at 5:42 PM EST

## 2022-01-27 NOTE — PROGRESS NOTES
Pt is admitted to room 5516. Pt is alert and oriented x4, VSS room air. 4 eyes Assessment completed. Pt is oriented about the call light and bed alarm. All fall precaution is in place and call light is with in the reach.

## 2022-01-27 NOTE — BRIEF OP NOTE
Brief Postoperative Note      Patient: Libra Campos  YOB: 1955  MRN: [id number]    Date of Procedure: 1/27/2022    Pre-Op Diagnosis: osteoarthritis of ankle, right [M19.271], Posterior tibial tendinitis of right leg [M76.821] Tightened flexors / equimus. Osseous defects tarsal, Subluxation / Dislocation of midfoot    Post-Op Diagnosis: Same       Procedure(s):  SUBTALAR JOINT ARTHRODESIS, TALAR NAVICULAR JOINT ARTHRODESIS, CALCANEOCUBOID JOINT ARTHRODESIS, OPEN GASTROCNEMIUS RECESSION; RIGHT LOWER EXTREMITY      Surgeon(s):  Dania Bravo DPM    Assistant:  Resident: Gallito Archer DPM    Anesthesia: General    Hemostasis: Pneumatic thigh tourniquet at 300 mmHg for 110 minutes    Injectables: 10cc of 1% lidocaine plain postoperatively    Materials: 3-0 vicryl, 3-0 nylon    Implants: Arthrex AlloSync DBM 5cc, Arthrex Allograft 5cc DBM  Arthrex Staples x5  Arthrex fully threaded screw x1    Estimated Blood Loss (mL): 449     Complications: None    Specimens:   * No specimens in log *    Implants:  Implant Name Type Inv. Item Serial No.  Lot No. LRB No. Used Action   AlloSync DBM Gel 5cc   [id number] [employer] [id number] Right 1 Implanted   ALLOGRAFT BNE PTTY 5 CC DBM ALLOSYNC - [id number]  ALLOGRAFT BNE PTTY 5 CC DBM ALLOSYNC [id number] [employer] [id number] Right 1 Implanted         Drains: * No LDAs found *    Findings: as expected, see op report    DISPO: s/p Triple arthrodesis, open gastrocnemius recession; right LE. NWB to RLE, PT/OT pending. Continue ice and elevation. Anticipate discharge tomorrow afternoon.      Electronically signed by Gallito Archer DPM on 1/27/2022 at 2:38 PM

## 2022-01-27 NOTE — ANESTHESIA PRE PROCEDURE
Department of Anesthesiology  Preprocedure Note       Name:  Sincere Petersen   Age:  73 y.o.  :  1955                                          MRN:  [id number]         Date:  2022      Surgeon: Angela Rangel):  Jody Rosa DPM    Procedure: Procedure(s):  RIGHT SUBTALAR JOINT ARTHRODESIS, THOMPSON PROCEDURE, TALAR NAVICULAR CUNEIFORM ARTHRODESIS, CALCANEAL OSTEOTOMY, MONDRAGON PROCEDURE, OPEN REDUCTION AND INTERNAL FIXATION  RIGHT OPEN GASTROCNEMIUS RECESSION  . Medications prior to admission:   Prior to Admission medications    Medication Sig Start Date End Date Taking? Authorizing Provider   Aspirin-Acetaminophen-Caffeine (EXCEDRIN MIGRAINE PO) Take 2 tablets by mouth as needed. Takes PRN migranes     Historical Provider, MD   citalopram (CELEXA) 20 MG tablet Take 20 mg by mouth daily. Historical Provider, MD       Current medications:    Current Facility-Administered Medications   Medication Dose Route Frequency Provider Last Rate Last Admin    ceFAZolin (ANCEF) 2000 mg in dextrose 5 % 50 mL IVPB  2,000 mg IntraVENous Once Jody Rosa DPM        lactated ringers infusion   IntraVENous Continuous Billy Cabrera DO           Allergies:  No Known Allergies    Problem List:  There is no problem list on file for this patient. Past Medical History:        Diagnosis Date    Abnormal Pap smear     Depression        Past Surgical History:        Procedure Laterality Date     SECTION      COLPOSCOPY      HYSTERECTOMY  11    laparoscopic supracervical    TUBAL LIGATION         Social History:    Social History     Tobacco Use    Smoking status: Never Smoker    Smokeless tobacco: Not on file   Substance Use Topics    Alcohol use: No                                Counseling given: Not Answered      Vital Signs (Current): There were no vitals filed for this visit.                                            BP Readings from Last 3 Encounters:   11 121/64 NPO Status:                                                                                 BMI:   Wt Readings from Last 3 Encounters:   06/24/11 153 lb (69.4 kg)   06/22/11 150 lb (68 kg)     There is no height or weight on file to calculate BMI.    CBC:   Lab Results   Component Value Date    WBC 6.4 06/21/2011    RBC 4.45 06/21/2011    HGB 13.6 06/21/2011    HCT 39.2 06/21/2011    MCV 88.1 06/21/2011    RDW 12.9 06/21/2011     06/21/2011       CMP:   Lab Results   Component Value Date     06/21/2011    K 4.0 06/21/2011     06/21/2011    CO2 28 06/21/2011    BUN 20 06/21/2011    CREATININE 0.9 06/21/2011    GFRAA >60 06/21/2011    GLUCOSE 81 06/21/2011    CALCIUM 10.3 06/21/2011       POC Tests: No results for input(s): POCGLU, POCNA, POCK, POCCL, POCBUN, POCHEMO, POCHCT in the last 72 hours. Coags: No results found for: PROTIME, INR, APTT    HCG (If Applicable): No results found for: PREGTESTUR, PREGSERUM, HCG, HCGQUANT     ABGs: No results found for: PHART, PO2ART, UYL6LAE, QTF1OCN, BEART, P5TFEFFG     Type & Screen (If Applicable):  Lab Results   Component Value Date    LABABO A 06/21/2011    [facility] FR[facility]ISCO Positive 06/21/2011       Drug/Infectious Status (If Applicable):  No results found for: HIV, HEPCAB    COVID-19 Screening (If Applicable): No results found for: COVID19        Anesthesia Evaluation  Patient summary reviewed and Nursing notes reviewed history of anesthetic complications:   Airway: Mallampati: II  TM distance: >3 FB   Neck ROM: full  Mouth opening: > = 3 FB Dental: normal exam         Pulmonary:Negative Pulmonary ROS and normal exam                               Cardiovascular:Negative CV ROS                      Neuro/Psych:   (+) psychiatric history:depression/anxiety             GI/Hepatic/Renal: Neg GI/Hepatic/Renal ROS            Endo/Other: Negative Endo/Other ROS                    Abdominal:             Vascular: negative vascular ROS.          Other Findings: Anesthesia Plan      general     ASA 2     (Right popliteal saphenous nerve block for post op pain control per surgeon request)  Induction: intravenous. MIPS: Postoperative opioids intended. Anesthetic plan and risks discussed with patient. Plan discussed with CRNA.     Attending anesthesiologist reviewed and agrees with Preprocedure content              Manjula Salazar MD   1/27/2022

## 2022-01-27 NOTE — PROGRESS NOTES
Pt admitted to pacu s/p RIGHT SUBTALAR JOINT ARTHRODESIS, THOMPSON PROCEDURE, TALAR NAVICULAR CUNEIFORM ARTHRODESIS, CALCANEAL OSTEOTOMY, MONDRAGON PROCEDURE OPEN REDUCTION AND INTERNAL FIXATION,  pt unresponsive- PO 96%% w oral airway in place w 3L , - R foot drsg/cast CDI - iced and elevated

## 2022-01-27 NOTE — ANESTHESIA PROCEDURE NOTES
Peripheral Block    Patient location during procedure: pre-op  Start time: 1/27/2022 10:43 AM  End time: 1/27/2022 11:00 AM  Staffing  Performed: anesthesiologist   Anesthesiologist: Juan Schumacher MD  Preanesthetic Checklist  Completed: patient identified, IV checked, site marked, risks and benefits discussed, surgical consent, monitors and equipment checked, pre-op evaluation, timeout performed, anesthesia consent given, oxygen available and patient being monitored  Peripheral Block  Prep: ChloraPrep  Patient monitoring: cardiac monitor, continuous pulse ox, frequent blood pressure checks and IV access  Block type: Sciatic  Laterality: right  Injection technique: single-shot  Guidance: ultrasound guided  Local infiltration: lidocaine  Infiltration strength: 1 %  Dose: 3 mL  Popliteal  Provider prep: mask and sterile gloves  Local infiltration: lidocaine  Needle  Needle gauge: 21 G  Needle length: 10 cm  Needle localization: ultrasound guidance  Assessment  Injection assessment: negative aspiration for heme, no paresthesia on injection and local visualized surrounding nerve on ultrasound  Paresthesia pain: none  Slow fractionated injection: yes  Hemodynamics: stable  Additional Notes  Immediately prior to procedure a \"time out\" was called to verify the correct patient, allergies, laterality, procedure and equipment. Time out performed with preop RN    Local Anesthetic: 0.5 %  Bupivacaine   Amount: 30 ml  in 5 ml increments after negative aspiration each time. Biceps Femoris muscle (long head), Vastus lateralis muscle, Sciatic nerve (Tibia and Common Peroneal Nerves) and Popliteal artery are identified; the tip of the needle and the spread of the local anesthetic around the Tibial and Common Peroneal Nerve are visualized. The Sciatic Nerve (Tibia and Common Peroneal Nerve) appeared to be anatomically normal and there were no abnormal pathologically findings seen.          Reason for block: post-op pain management and at surgeon's request

## 2022-01-27 NOTE — ANESTHESIA PROCEDURE NOTES
Peripheral Block    Patient location during procedure: pre-op  Start time: 1/27/2022 10:43 AM  End time: 1/27/2022 11:00 AM  Staffing  Performed: anesthesiologist   Anesthesiologist: Yue Richarsdon MD  Preanesthetic Checklist  Completed: patient identified, IV checked, site marked, risks and benefits discussed, surgical consent, monitors and equipment checked, pre-op evaluation, timeout performed, anesthesia consent given, oxygen available and patient being monitored  Peripheral Block  Patient position: supine  Prep: ChloraPrep  Patient monitoring: cardiac monitor, continuous pulse ox, frequent blood pressure checks and IV access  Block type: Femoral  Laterality: right  Injection technique: single-shot  Guidance: ultrasound guided  Local infiltration: lidocaine  Infiltration strength: 1 %  Adductor canal  Provider prep: mask and sterile gloves  Local infiltration: lidocaine  Needle  Needle type: combined needle/nerve stimulator   Needle gauge: 22 G  Needle length: 5 cm  Needle localization: ultrasound guidance  Assessment  Injection assessment: negative aspiration for heme, no paresthesia on injection and local visualized surrounding nerve on ultrasound  Slow fractionated injection: yes  Hemodynamics: stable  Additional Notes  Sartorius and Vastus Medialis Muscle, Femoral artery and Saphenous nerve are identified; the tip of the needle and the spread of the local anesthetic around the Saphenous nerve are visualized. The Saphenous nerve appeared to be anatomically normal and there were no abnormal pathologically findings seen.    Reason for block: post-op pain management and at surgeon's request

## 2022-01-28 VITALS
OXYGEN SATURATION: 99 % | SYSTOLIC BLOOD PRESSURE: 121 MMHG | HEART RATE: 71 BPM | TEMPERATURE: 98.5 F | RESPIRATION RATE: 18 BRPM | WEIGHT: 190 LBS | HEIGHT: 64 IN | DIASTOLIC BLOOD PRESSURE: 72 MMHG | BODY MASS INDEX: 32.44 KG/M2

## 2022-01-28 LAB
ANION GAP SERPL CALCULATED.3IONS-SCNC: 6 MMOL/L (ref 3–16)
BASOPHILS ABSOLUTE: 0 K/UL (ref 0–0.2)
BASOPHILS RELATIVE PERCENT: 0.3 %
BUN BLDV-MCNC: 19 MG/DL (ref 7–20)
CALCIUM SERPL-MCNC: 9.1 MG/DL (ref 8.3–10.6)
CHLORIDE BLD-SCNC: 102 MMOL/L (ref 99–110)
CO2: 27 MMOL/L (ref 21–32)
CREAT SERPL-MCNC: 1 MG/DL (ref 0.6–1.2)
EOSINOPHILS ABSOLUTE: 0 K/UL (ref 0–0.6)
EOSINOPHILS RELATIVE PERCENT: 0.2 %
GFR AFRICAN AMERICAN: >60
GFR NON-AFRICAN AMERICAN: 56
GLUCOSE BLD-MCNC: 97 MG/DL (ref 70–99)
HCT VFR BLD CALC: 35.7 % (ref 36–48)
HEMOGLOBIN: 11.9 G/DL (ref 12–16)
LYMPHOCYTES ABSOLUTE: 2.7 K/UL (ref 1–5.1)
LYMPHOCYTES RELATIVE PERCENT: 27.7 %
MCH RBC QN AUTO: 29.8 PG (ref 26–34)
MCHC RBC AUTO-ENTMCNC: 33.2 G/DL (ref 31–36)
MCV RBC AUTO: 89.6 FL (ref 80–100)
MONOCYTES ABSOLUTE: 0.8 K/UL (ref 0–1.3)
MONOCYTES RELATIVE PERCENT: 8.5 %
NEUTROPHILS ABSOLUTE: 6.2 K/UL (ref 1.7–7.7)
NEUTROPHILS RELATIVE PERCENT: 63.3 %
PDW BLD-RTO: 13.8 % (ref 12.4–15.4)
PLATELET # BLD: 192 K/UL (ref 135–450)
PMV BLD AUTO: 8.3 FL (ref 5–10.5)
POTASSIUM REFLEX MAGNESIUM: 4.1 MMOL/L (ref 3.5–5.1)
RBC # BLD: 3.98 M/UL (ref 4–5.2)
SODIUM BLD-SCNC: 135 MMOL/L (ref 136–145)
WBC # BLD: 9.8 K/UL (ref 4–11)

## 2022-01-28 PROCEDURE — 97116 GAIT TRAINING THERAPY: CPT

## 2022-01-28 PROCEDURE — 97166 OT EVAL MOD COMPLEX 45 MIN: CPT

## 2022-01-28 PROCEDURE — G0378 HOSPITAL OBSERVATION PER HR: HCPCS

## 2022-01-28 PROCEDURE — 80048 BASIC METABOLIC PNL TOTAL CA: CPT

## 2022-01-28 PROCEDURE — 2580000003 HC RX 258: Performed by: PODIATRIST

## 2022-01-28 PROCEDURE — 97530 THERAPEUTIC ACTIVITIES: CPT

## 2022-01-28 PROCEDURE — 6370000000 HC RX 637 (ALT 250 FOR IP): Performed by: PODIATRIST

## 2022-01-28 PROCEDURE — 97162 PT EVAL MOD COMPLEX 30 MIN: CPT

## 2022-01-28 PROCEDURE — 97535 SELF CARE MNGMENT TRAINING: CPT

## 2022-01-28 PROCEDURE — 85025 COMPLETE CBC W/AUTO DIFF WBC: CPT

## 2022-01-28 PROCEDURE — 36415 COLL VENOUS BLD VENIPUNCTURE: CPT

## 2022-01-28 RX ORDER — OXYCODONE HYDROCHLORIDE AND ACETAMINOPHEN 5; 325 MG/1; MG/1
1 TABLET ORAL EVERY 4 HOURS PRN
Qty: 42 TABLET | Refills: 0 | Status: SHIPPED | OUTPATIENT
Start: 2022-01-28 | End: 2022-02-04

## 2022-01-28 RX ORDER — IBUPROFEN 800 MG/1
800 TABLET ORAL EVERY 8 HOURS PRN
Qty: 30 TABLET | Refills: 0 | Status: SHIPPED | OUTPATIENT
Start: 2022-01-28

## 2022-01-28 RX ADMIN — OXYCODONE HYDROCHLORIDE 10 MG: 5 TABLET ORAL at 14:35

## 2022-01-28 RX ADMIN — OXYCODONE HYDROCHLORIDE 5 MG: 5 TABLET ORAL at 08:09

## 2022-01-28 RX ADMIN — ACETAMINOPHEN 650 MG: 325 TABLET ORAL at 06:38

## 2022-01-28 RX ADMIN — SODIUM CHLORIDE, PRESERVATIVE FREE 10 ML: 5 INJECTION INTRAVENOUS at 08:09

## 2022-01-28 RX ADMIN — CITALOPRAM HYDROBROMIDE 20 MG: 20 TABLET ORAL at 08:09

## 2022-01-28 ASSESSMENT — PAIN DESCRIPTION - FREQUENCY
FREQUENCY: CONTINUOUS
FREQUENCY: CONTINUOUS

## 2022-01-28 ASSESSMENT — PAIN DESCRIPTION - LOCATION
LOCATION: ANKLE;FOOT
LOCATION: ANKLE;FOOT

## 2022-01-28 ASSESSMENT — PAIN DESCRIPTION - PROGRESSION
CLINICAL_PROGRESSION: GRADUALLY WORSENING
CLINICAL_PROGRESSION: GRADUALLY WORSENING

## 2022-01-28 ASSESSMENT — PAIN DESCRIPTION - DESCRIPTORS
DESCRIPTORS: ACHING
DESCRIPTORS: ACHING

## 2022-01-28 ASSESSMENT — PAIN DESCRIPTION - PAIN TYPE
TYPE: SURGICAL PAIN
TYPE: SURGICAL PAIN

## 2022-01-28 ASSESSMENT — PAIN SCALES - GENERAL
PAINLEVEL_OUTOF10: 3
PAINLEVEL_OUTOF10: 5
PAINLEVEL_OUTOF10: 8
PAINLEVEL_OUTOF10: 3
PAINLEVEL_OUTOF10: 3

## 2022-01-28 ASSESSMENT — PAIN DESCRIPTION - ORIENTATION
ORIENTATION: RIGHT
ORIENTATION: RIGHT

## 2022-01-28 ASSESSMENT — PAIN - FUNCTIONAL ASSESSMENT
PAIN_FUNCTIONAL_ASSESSMENT: PREVENTS OR INTERFERES SOME ACTIVE ACTIVITIES AND ADLS
PAIN_FUNCTIONAL_ASSESSMENT: PREVENTS OR INTERFERES SOME ACTIVE ACTIVITIES AND ADLS

## 2022-01-28 ASSESSMENT — PAIN DESCRIPTION - ONSET
ONSET: ON-GOING
ONSET: ON-GOING

## 2022-01-28 NOTE — BRIEF OP NOTE
Brief Postoperative Note      Patient: Lexii Barnes  YOB: 1955  MRN: [id number]    Date of Procedure: 1/27/2022    Pre-Op Diagnosis:   osteoarthritis of ankle, right [M19.271],   Posterior tibial tendinitis of right leg [M76.821]   Tightened flexors / equimus. Osseous defects tarsal, Subluxation / Dislocation of midfoot    Post-Op Diagnosis: Same       Procedures:  Resction of Tarsal bone osseous defects  Arthrodesis of Subtalar Joint  Artrodesis of Midtarsal joint  Open Gastrocnemius Recession    Injectables:  30 cc 0.5% Marcaine plain    Materials:  3-0 vicryl, and 3-0 Nylon  Arthrex Staples: 20 x 20 (2), 25x20 (1), 18x15 (2)  7-0 Cannulated headless screw  Bone Marrow Aspirate and PRP Obtainment from patient  Arthrex DBM - 10 ml materail    Hemostasis:  Thigh tourneqet - 300 mmHg at 87 min    Surgeon(s):  Raúl Hauser DPM    Assistant:  Resident: Melchor Baxter DPM    Anesthesia: General    Estimated Blood Loss (mL): less than 50     Complications: None    Specimens:   * No specimens in log *    Implants:  Implant Name Type Inv.  Item Serial No.  Lot No. LRB No. Used Action   AlloSync DBM Gel 5cc   [id number] [employer] [id number] Right 1 Implanted   ALLOGRAFT BNE PTTY 5 CC DBM ALLOSYNC - [id number]  ALLOGRAFT BNE PTTY 5 CC DBM ALLOSYNC [id number] [employer] [id number] Right 1 Implanted   STAPLE BNE 20X25 MM NIT DYNANITE SUPER MX  STAPLE BNE 20X25 MM NIT DYNANITE SUPER MX  [employer] [id number] Right 1 Implanted   STAPLE BONE FIX [id number] NIT COMPR W/ INSTRMT LO PROF FOR  STAPLE BONE FIX [id number] NIT COMPR W/ INSTRMT LO PROF FOR  [employer]  Right 2 Implanted   SCREW BNE L70MM DIA7MM XL HDLSS COMPR FULL THRD  SCREW BNE L70MM DIA7MM XL HDLSS COMPR FULL THRD  [employer]  Right 1 Implanted         Drains: * No LDAs found *    Findings: dictatded    No complications    Electronically signed by Maryann Warner DPM on 1/28/2022 at 9:26 AM

## 2022-01-28 NOTE — PROGRESS NOTES
Podiatric Surgery Daily Progress Note  Lexii Barnes      Subjective :   Patient seen and examined this am at the bedside. Patient denies any acute overnight events. Patient denies N/V/F/C/SOB. Patient denies calf pain, thigh pain, chest pain. Patient states her pain is well controlled, however she is starting to experience some tingling sensation in her right ankle. Review of Systems: A 12 point review of symptoms is unremarkable with the exception of the chief complaint. Patient specifically denies nausea, fever, vomiting, chills, shortness of breath, chest pain, abdominal pain, constipation or difficulty urinating. Objective     BP (!) 141/66   Pulse 60   Temp 98.6 °F (37 °C) (Oral)   Resp 18   Ht 5' 4\" (1.626 m)   Wt 190 lb (86.2 kg)   LMP 01/29/2011   SpO2 96%   BMI 32.61 kg/m²     I/O:    Intake/Output Summary (Last 24 hours) at 1/28/2022 0556  Last data filed at 1/27/2022 1515  Gross per 24 hour   Intake 1600 ml   Output 150 ml   Net 1450 ml              Wt Readings from Last 3 Encounters:   01/27/22 190 lb (86.2 kg)   06/24/11 153 lb (69.4 kg)   06/22/11 150 lb (68 kg)       LABS:  No results for input(s): WBC, HGB, HCT, PLT in the last 72 hours. No results for input(s): NA, K, CL, CO2, PHOS, BUN, CREATININE, CA in the last 72 hours. No results for input(s): PROT, INR, APTT in the last 72 hours. LOWER EXTREMITY EXAMINATION    Fiberglass cast to right lower extremity clean, dry, intact. No strikethrough drainage noted. Patient is able to actively flex/extend visible digits. Gross sensation is intact. No pain with calf compression. IMAGING:  XR Right Foot 1/27 post-op  Narrative   Clinical history: Status post triple arthrodesis.       Comparisons: None.       FINDINGS:       3 projections of the right foot, imaged with patient in fiberglass cast. Cortical screw extends across the talocalcaneal joint.  Additional staples are present at the calcaneocuboid joint and at the talonavicular joint.       Impression   1. Status post arthrodesis across the talocalcaneal, calcaneocuboid, and talonavicular joints. ASSESSMENT/PLAN  -S/p subtalar joint arthrodesis, talonavicular joint arthrodesis, calcaneocuboid joint arthrodesis, open gastrocnemius recession; right lower extremity  -Post-op pain  -Ambulatory Dysfunction      -Patient examined and evaluated at bedside   -Hypertensive, otherwise VSS, AM labs pending  -Imaging reviewed, impression noted above  -Dressing to right LE left clean, dry, intact  -Nonweight bearing to right LE  -No further surgical intervention from podiatric standpoint  -Hospitalist consulted for medical management, appreciate recommendations  -Prescriptions for Percocet, Motrin, Eliquis signed, placed in patient chart. DIET: Adult regular  DVT Prophylaxis: SCDs  PT/OT: pending  DISPO: s/p Triple arthrodesis, open gastrocnemius recession; right LE. NWB to RLE, PT/OT pending. Continue ice and elevation. Discharge pending PT/OT eval, pain control, medical clearance.      Patient examined and evaluated at bedside with Dr. Leeanna Tucker DPM.    Jaylan Muñoz DPM  Podiatric Resident, PGY-2  Pager #: [phone] or Perfect Serve

## 2022-01-28 NOTE — PROGRESS NOTES
Physical Therapy    Facility/Department: [facility] 5T ORTHO/NEURO  Initial Assessment    NAME: Delmi Peng  : 1955  MRN: [id number]    Date of Service: 2022    Discharge Recommendations: Delmi Peng scored a 16/24 on the AM-PAC short mobility form. Current research shows that an AM-PAC score of 17 or less is typically not associated with a discharge to the patient's home setting. Based on the patient's AM-PAC score and their current functional mobility deficits, it is recommended that the patient have 5-7 sessions per week of Physical Therapy at d/c to increase the patient's independence. At this time, this patient demonstrates the endurance, and/or tolerance for 3 hours of therapy each day, with a treatment frequency of 5-7x/wk. Please see assessment section for further patient specific details. If patient discharges prior to next session this note will serve as a discharge summary. Please see below for the latest assessment towards goals. PT Equipment Recommendations  Equipment Needed: Yes  Mobility Devices: Dennis Fell; Wheelchair  Walker: Rolling  Wheelchair: Transport Chair    Assessment   Body structures, Functions, Activity limitations: Decreased functional mobility ; Decreased endurance;Decreased strength;Decreased balance;Decreased safe awareness; Increased pain  Assessment: pt is a 72 yo female s/p ankle surgery typically independent with all mobility and ADLs without adaptive equipment now limited by NWB status on RLE and functioning well below baseline. pt required CGA/min A with transfers and short distance ambulation limited by fatigue and poor endurance unable to ambulate further than <> bathroom. pt had a large LOB while negotiating 1 step with BHR requiring max A of 2 to correct. pt has no DME available and is unable to complete steps which are required to get in and out of the home. pt has previous falls and is a high fall risk due to non weightbearing status.  Recommend pt seek further IP PT in order to maximize independence and safety to decrease fall risk. Treatment Diagnosis: dec functional mobility  Prognosis: Fair  Decision Making: Medium Complexity  PT Education: Goals;PT Role;Plan of Care;General Safety; Functional Mobility Training;Disease Specific Education;Weight-bearing Education  Patient Education: pt will benefit from reinforcement  REQUIRES PT FOLLOW UP: Yes  Activity Tolerance  Activity Tolerance: Patient limited by fatigue;Patient limited by endurance  Activity Tolerance: limited by NWB status       Patient Diagnosis(es): The encounter diagnosis was Post-op pain. has a past medical history of Abnormal Pap smear and Depression. has a past surgical history that includes Tubal ligation;  section; Colposcopy; Hysterectomy (11); Ankle surgery (Right, 2022); Gastrocnemius Recession (Right, 2022); and Foot surgery (Right, 2022). Restrictions  Position Activity Restriction  Other position/activity restrictions: Nonweight bearing to right LE  Vision/Hearing  Vision: Impaired  Vision Exceptions: Wears glasses at all times  Hearing: Within functional limits     Subjective  General  Chart Reviewed:  Yes  Additional Pertinent Hx: pt is a 74 yo female s/p RIGHT SUBTALAR JOINT ARTHRODESIS, THOMPSON PROCEDURE, TALAR NAVICULAR CUNEIFORM ARTHRODESIS, CALCANEAL OSTEOTOMY, MONDRAGON PROCEDURE, OPEN REDUCTION AND INTERNAL FIXATION (Right )    RIGHT OPEN GASTROCNEMIUS RECESSION  Referring Practitioner: Carloz Brown MD  Diagnosis: secondary OA of R ankle  Follows Commands: Within Functional Limits  Subjective  Subjective: pt supine in bed and agreeable to PT eval  Pain Screening  Patient Currently in Pain: Yes  Vital Signs  Patient Currently in Pain: Yes (5/10 in R ankle)       Orientation  Orientation  Overall Orientation Status: Within Normal Limits  Social/Functional History  Social/Functional History  Lives With: Alone  Type of Home:  (mobile home)  Home Access: Stairs to enter without rails  Entrance Stairs - Number of Steps: 4  Bathroom Shower/Tub: Walk-in shower  Bathroom Toilet: Standard (sink to push up from)  [employer]: Shower chair,Grab bars in shower  Home Equipment:  (no oDME)  Receives Help From:  (boyfriend can assist)  ADL Assistance: Independent  Homemaking Assistance: Independent  Homemaking Responsibilities: Yes  Ambulation Assistance: Independent  Transfer Assistance: Independent  Active : Yes  Patient's  Info: boyfriend will drive for now  Occupation: Full time employment  Type of occupation: work at [employer]  Additional Comments: will be going to boyfriends who has a few GALLO with railing, everything on first floor. Boyfriend will be able to assist as needed. 2 falls due to ankle giving out in past few months  Cognition        Objective             Strength RLE  Comment: DNT due to s/p ankle surgery  Strength LLE  Strength LLE: WFL        Bed mobility  Supine to Sit: Stand by assistance  Scooting: Stand by assistance  Comment: HOB elevated  Transfers  Sit to Stand: Contact guard assistance;Minimal Assistance (at RW from EOB with CGA, from wc x2 with CGA, from toilet with min A)  Stand to sit: Contact guard assistance  Stand Pivot Transfers: Contact guard assistance (from wc to chair with RW with CGA)  Comment: max VC for hand placement and to maintain NWB on RLE  Ambulation  Ambulation?: Yes  Ambulation 1  Surface: level tile  Device: Rolling Walker  Assistance: Contact guard assistance  Quality of Gait: hop to gait pattern with minimal clearance of LLE, ++ effort, vc required to maintain NWB on RLE  Gait Deviations: Slow Maggie; Shuffles;Decreased step length;Decreased step height  Stairs/Curb  Stairs?: Yes  Stairs  # Steps : 1  Stairs Height: 6\"  Rails: Bilateral  Assistance: 2 Person assistance;Dependent/Total (max A of 2)  Comment: pt unable to hop high enough to clear 6 inch step. max A of 2 to save LOB on steps.  unsafe to attempt further stair negotiation    Attempted ambulation with crutches per pt request, pt required min- -mod A for ambulation using crutches while maintaining NWB on RLE for 20'. Pt experienced multiple LOB due to instability while using crutches and verbalized feeling safer while using RW. Pt demonstrated minimal toe clearance with hop to gait pattern making it unsafe to attempt steps using crutches.  Pt also demonstrated increased difficulty with transfers using crutches requiring min A.      Balance  Posture: Fair  Sitting - Static: Good  Sitting - Dynamic: Fair;+  Standing - Static: Fair  Standing - Dynamic: Fair;-  Comments: standing balance at sink maintained at RW with CGA, dec tolerance due to NWB status        Plan   Plan  Times per week: 5-7  Current Treatment Recommendations: Bertha Doyle Re-education,Patient/Caregiver Education & Training,Gait Training,Balance Training,Functional Mobility Training,Stair training,Safety Education & Training,Pain Management,Equipment Evaluation, Education, & procurement,Home Exercise Program,Wheelchair Mobility Training  Safety Devices  Type of devices: Call light within reach,Chair alarm in place,Left in chair,Gait belt,Nurse notified    G-Code       OutComes Score                                                  AM-PAC Score  AM-PAC Inpatient Mobility Raw Score : 16 (01/28/22 1129)  AM-PAC Inpatient T-Scale Score : 40.78 (01/28/22 1129)  Mobility Inpatient CMS 0-100% Score: 54.16 (01/28/22 1129)  Mobility Inpatient CMS G-Code Modifier : CK (01/28/22 1129)          Goals  Short term goals  Time Frame for Short term goals: by dc  Short term goal 1: pt will perform bed mobility with indep  Short term goal 2: pt will perform sit <> stand transfers with Sup  Short term goal 3: pt will perform stand pivot transfers with RW and Sup  Short term goal 4: pt will ambulate 48' with RW and Sup  Short term goal 5: pt will negotiate 4 steps with BHR and CGA  Patient Goals   Patient goals : to go home       Therapy Time   Individual Concurrent Group Co-treatment   Time In 1015         Time Out 1108         Minutes 53              Timed Code Treatment Minutes:  38 min     Total Treatment Minutes:  53 min   Second Session Therapy Time:   Individual Concurrent Group Co-treatment   Time In 1145         Time Out 1200         Minutes 15            Timed Code Treatment Minutes:  53 +15    Total Treatment Minutes:  68 min       Christianne Fischer, PT

## 2022-01-28 NOTE — CARE COORDINATION
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]  Posterior tibial tendinitis of right leg [M76.821]  Major osseous defect, right ankle and foot [M89.771]  Subluxation of tarsometatarsal joint of right foot, initial encounter [S93.321A]  Posterior tibial tendonitis, right [M76.821]  Ambulatory dysfunction [R26.2]   Date / Time: 1/27/2022  8:42 AM    Current PCP: Srikanth Gallo MD  Clinic patient: No    Hospitalization in the last 30 days: No    Advance Directives:  Code Status: Full Code  [place] DNR form completed and on chart: No    Financial:  Payor: Sade Estrada / Plan: Poppy Peterson / Product Type: *No Product type* /      Pharmacy:    [facility] [address] ARZOLA [address] [place] [address] [phone] Tyler Reidvert [phone]  [address] [address]  Phone: [phone] Fax: [phone]      Assistance purchasing medications?: Potential Assistance Purchasing Medications: No  Assistance provided by Case Management: None at this time    Does patient want to participate in local refill/ meds to beds program?:      Meds To Beds General Rules:  1. Can ONLY be done Monday- Friday between 8:30am-5pm  2. Prescription(s) must be in pharmacy by 3pm to be filled same day  3. Copy of patient's insurance/ prescription drug card and patient face sheet must be sent along with the prescription(s)  4. Cost of Rx cannot be added to hospital bill. If financial assistance is needed, please contact unit  or ;  or  CANNOT provide pharmacy voucher for patients co-pays  5.  Patients can then  the prescription on their way out of the hospital at discharge, or pharmacy can deliver to the bedside if staff is available. (payment due at time of pick-up or delivery - cash, check, or card accepted)     Able to afford home medications/ co-pay costs: Yes    ADLS:  Current PT AM-PAC Score: 16 /24  Current OT AM-PAC Score: 17 /24      Discharge Disposition: Home with [address]:   [facility] of [facility] Phone: [phone]    LOC at discharge: Skilled  [address] Completed: Yes    Notification completed in HENS/PAS?:  Not Applicable    IMM Completed:   Not Indicated    Transportation:  Transportation Plan for discharge: family   Mode of Transport: Private Car  Transport Time: 4:00 pm   Transportation form completed: Yes    Durable Medical Equipment:  DME Provider: Shakeel Martinez obtained during hospitalization: [address] and Respiratory Equipment:  Oxygen needed at discharge?: Yes  [address]: Not Applicable  Portable tank available for discharge?: Not Indicated    Additional CM Notes:   SW met with patient at bedside on this date. Patient medically cleared for discharge by team. SW had multiple discussion with therapy team and medical team. Patient wants to go home. She was agreeable to home health care. Will be staying with her Boyfriend at [facility]. Was able to get services through [facility]. Orders sent. Patient to have transport by boyfriend and help from his grandson to gain access to home.      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

## 2022-01-28 NOTE — CONSULTS
Consulted for medical management. Pt with no active medical issues. On celexa for depression. No other major co-morbidities noted. Discussed with RN and podiatry, no further evaluation needed. Will sign off. Please do not hesitate to contact if any medical concerns or questions. Thank you.     Severo Rossi MD  Attending Physician  Hospitalist

## 2022-01-28 NOTE — DISCHARGE INSTR - COC
Continuity of Care Form    Patient Name: Ivis Zuleta   :  1955  MRN:  [id number]    Admit date:  2022  Discharge date:  2022    Code Status Order: Full Code   Advance Directives:   Advance Care Flowsheet Documentation       Date/Time Healthcare Directive Type of Healthcare Directive Copy in [address] Agent's Name Healthcare Agent's Phone Number    22 1021 No, patient does not have an advance directive for healthcare treatment -- -- -- -- --            Admitting Physician:  Lennox Mai, DPM  PCP: Brittney Rodriguez MD    Discharging Nurse: [facility] Unit/Room#: 4193/8497-76  Discharging Unit Phone Number: ***    Emergency Contact:   Extended Emergency Contact Information  Primary Emergency Contact: Nasir Fernandez  Home Phone: [phone]  Relation: Child  Secondary Emergency Contact: Daisy Amaral Phone: [phone]  Relation: Child    Past Surgical History:  Past Surgical History:   Procedure Laterality Date    ANKLE SURGERY Right 2022    RIGHT SUBTALAR JOINT ARTHRODESIS, THOMPSON PROCEDURE, TALAR NAVICULAR CUNEIFORM ARTHRODESIS, CALCANEAL OSTEOTOMY, MONDRAGON PROCEDURE, OPEN REDUCTION AND INTERNAL FIXATION performed by Lennox Mai, DPM at [facility] Right 2022    . performed by Lennox Mai, DPM at [address] [place] [address] Right 2022    RIGHT OPEN GASTROCNEMIUS RECESSION performed by Lennox Mai, DPM at [address] [place] [address]  11    laparoscopic supracervical    TUBAL LIGATION         Immunization History: There is no immunization history on file for this patient.     Active Problems:  Patient Active Problem List   Diagnosis Code    Ambulatory dysfunction R26.2       Isolation/Infection:   Isolation            No Isolation          Patient Infection Status       None to display            Nurse Assessment:  Last Vital Signs: /64   Pulse 82   Temp 99.5 °F (37.5 °C) (Oral)   Resp 18   Ht 5' 4\" (1.626 m)   Wt 190 lb (86.2 kg)   LMP 01/29/2011   SpO2 99%   BMI 32.61 kg/m²     Last documented pain score (0-10 scale): Pain Level: 5  Last Weight:   Wt Readings from Last 1 Encounters:   01/27/22 190 lb (86.2 kg)     Mental Status:  {IP PT MENTAL STATUS:11741}    IV Access:  { CRESENCIO IV ACCESS:143497433}    Nursing Mobility/ADLs:  Walking   {CHP DME OYAT:262071675}  Transfer  {CHP DME ZUWO:836433696}  Bathing  {CHP DME THLW:781165725}  Dressing  {CHP DME GXPW:207634011}  Toileting  {CHP DME LJXV:405481444}  Feeding  {P DME EHRA:750487393}  Med Admin  {P DME MYDF:647838746}  Med Delivery   { CRESENCIO MED Delivery:042029561}    Wound Care Documentation and Therapy:  Incision 06/24/11 Abdomen (Active)   Number of days: 8562        Elimination:  Continence: Bowel: {YES / TP:33027}  Bladder: {YES / VQ:02270}  Urinary Catheter: {Urinary Catheter:089244092}   Colostomy/Ileostomy/Ileal Conduit: {YES / MW:57730}       Date of Last BM: ***    Intake/Output Summary (Last 24 hours) at 1/28/2022 0842  Last data filed at 1/28/2022 0810  Gross per 24 hour   Intake 1730 ml   Output 150 ml   Net 1580 ml     I/O last 3 completed shifts:   In: 1600 [I.V.:1600]  Out: 150 [Urine:100; Blood:50]    Safety Concerns:     [address] Safety Concerns:866826493}    Impairments/Disabilities:      [address] Impairments/Disabilities:377156673}    Nutrition Therapy:  Current Nutrition Therapy:   [address] [employer] Diet List:416309589}    Routes of Feeding: {P DME Other Feedings:456888416}  Liquids: {Slp liquid thickness:41591}  Daily Fluid Restriction: {CHP DME Yes amt example:273640329}  Last Modified Barium Swallow with Video (Video Swallowing Test): {Done Not Done OPAL:637655620}    Treatments at the Time of Hospital Discharge:   Respiratory Treatments: ***  Oxygen Therapy:  {Therapy; copd oxygen:55143}  Ventilator:    {MH CC Vent RRMF:072786200}    Rehab Therapies: {THERAPEUTIC INTERVENTION:8387623172}  Weight Bearing Status/Restrictions: Mariel Andrade CC Weight Bearin}  Other Medical Equipment (for information only, NOT a DME order):  {EQUIPMENT:885212685}  Other Treatments: ***    Patient's personal belongings (please select all that are sent with patient):  {CHP DME Belongings:566053589}    RN SIGNATURE:  {Esignature:676188685}    CASE MANAGEMENT/SOCIAL WORK SECTION    Inpatient Status Date: ***    Readmission Risk Assessment Score:  Readmission Risk              Risk of Unplanned Readmission:  0           Discharging to Facility/ Agency   Name:   Address:  Phone:  Fax:    Dialysis Facility (if applicable)   Name:  Address:  Dialysis Schedule:  Phone:  Fax:    / signature: {Esignature:560284330}    PHYSICIAN SECTION    Prognosis: Good    Condition at Discharge: Stable    Rehab Potential (if transferring to Rehab): Not applicable    Recommended Labs or Other Treatments After Discharge:    Home PT and OT    Podiatric Post Operative Instructions: You have had a surgical procedure on your right foot. Fluids and Diet:  Begin with clear liquids, broth, dry toast, and crackers. If not nauseated then resume your regular pre-operative diet when you are ready    Medications: Take your prescriptions as directed  You are receiving new prescriptions for Percocet for pain, Motrin for swelling, Eliquis for a blood thinner   If your pain is not severe then you may take the non-prescription medication that you normally take for aches and pains  You may resume your regularly scheduled medications (unless otherwise directed)  If any side effects or adverse reactions occur, discontinue the medication and contact your doctor. Review the patient drug information that is provided before you take any medication    Ambulation and Activity:  You are advised to go directly home from the hospital  Use assistive devices as needed  You may not put weight on the operated foot. Avoid stairs if possible. Do not lift or move heavy objects  Do not drive until cleared by Dr. Anel Partida DPM    Bandage and Wound Care Instructions:  Keep bandage clean and dry  Do not shower or bathe the operative extremity  Do not remove the bandage (unless otherwise directed)  Do not attempt to put anything between the cast or dressing and your skin, some itching is normal.    Ice and Elevation:  Elevate operative extremity as much as possible to reduce swelling and discomfort. Elevate with 2 pillows at or above the level of the heart for the first 72 hours. Ice:  [facility] dispensed insulated ice bag over the bandage 20 minutes of every hour while awake for the first 72 hours. You may behind the knee as well. Special Instructions: Call your doctor immediately if you develop any of the following. Fever over 100 degrees by mouth - take your temperature daily until your first follow up visit. Pain not relieved by medication ordered  Swelling, increased redness, warmth, or hardness around operative area. Numb, tingling or cold toes. Toe(s) become white or bluish  Bandage becomes wet, soiled, or blood soaked (small amount of bleeding may be normal)  Increased or progressive drainage from surgical area. Follow up instructions: You will need to follow up with Dr. Rajesh lazcano in the next 5 to 7 days. Call [phone] when you get home to make an appointment. Call Dr. Rajesh lazcano if you have any questions or concerns. Dr. Anel Partida DPM  [facility]  Office: [phone]  Fax: [phone]    Physician Certification: I certify the above information and transfer of Evelyn Rico  is necessary for the continuing treatment of the diagnosis listed and that she requires Home Care for greater 30 days.      Update Admission H&P: No change in H&P    PHYSICIAN SIGNATURE:  Electronically signed by Neeraj Rehman DPM on 1/28/22 at 11:57 AM EST

## 2022-01-28 NOTE — CARE COORDINATION
CTN contacted [facility], [facility]. Noam Mora [phone], faxed referral to

## 2022-01-28 NOTE — PROGRESS NOTES
Discharge order placed. Filled prescriptions delivered to bedside. IV removed and patient dressed. Son to transport home. Discharge instructions discussed with patient who verbalized understanding. All questions answered. Patient to car in wheelchair with all belongings.

## 2022-01-28 NOTE — PROGRESS NOTES
Lengthy discussion with patient regarding her physical therapy and occupational therapy scores and the risk of going home while right lower extremity non-weightbearing and her difficulty with stairs. Discussed with patient that she will be on an anticoagulant while at home and if she were to fall she is at risk of an excessive bleed. Patient states that she wants to go home and will have the assistance of her boyfriend to get up and down the stairs. Patient also states that she can sit down on her buttock to get up and down the stairs as needed. Home health care order for home physical therapy placed, outpatient DME for wheelchair and walker also placed.     Discussed with Dr. Jasmyn Dotson DPM.    Mahogany Bennett DPM  Podiatric Resident PGY3  1/28/2022, 12:55 PM

## 2022-01-28 NOTE — PROGRESS NOTES
Occupational Therapy   Occupational Therapy Initial Assessment/ Treatment  Date: 2022   Patient Name: Natalia Gerard  MRN: [id number]     : 1955    Date of Service: 2022    Discharge Recommendations:     OT Equipment Recommendations  Equipment Needed: No  Other: defer to dc location- if home, BSC, ramp, RW, transport chair  Natalia Gerard scored a 17/24 on the AM-PAC ADL Inpatient form. Current research shows that an AM-PAC score of 17 or less is typically not associated with a discharge to the patient's home setting. Based on the patient's AM-PAC score and their current ADL deficits, it is recommended that the patient have 5-7 sessions per week of Occupational Therapy at d/c to increase the patient's independence. At this time, this patient demonstrates the endurance, and/or tolerance for 3 hours of therapy each day, with a treatment frequency of 5-7x/wk. Please see assessment section for further patient specific details. If patient discharges prior to next session this note will serve as a discharge summary. Please see below for the latest assessment towards goals. Assessment   Performance deficits / Impairments: Decreased functional mobility ; Decreased endurance;Decreased coordination;Decreased ADL status; Decreased balance;Decreased high-level IADLs  Assessment: Prior to admission pt was living at home alone, was independent with ADLs and IADLs. Pt now presents below her baseline, now NWB R LE. Pt demonstrating CGA for mobiltiy and transfers, requiring increased time and effort for mobility/ hopping with RW. Pt required min A for LB dressing, standing at sink with CGA to SBA. Pt required max A x 2 for hopping up step, however was unable to complete more than one step. Pt would benefit from continued OT while in acute care, AMPA score indicates non homebound discharge.  If pt discharges home would likely require ramp to enter her/ boyfriend's home, BSC with armrests, RW, and likely transport chair for longer distances- HHOT frontloaded if possible. Will continue OT POC. Treatment Diagnosis: decreased ADLs and transfers secondary to OA  Prognosis: Fair  Decision Making: Medium Complexity  OT Education: OT Role;Plan of Care  Patient Education: verb understanding  REQUIRES OT FOLLOW UP: Yes  Activity Tolerance  Activity Tolerance: Patient Tolerated treatment well;Patient limited by fatigue  Activity Tolerance: Pt reported mod to max fatigue after mobility in room/ toileting  Safety Devices  Safety Devices in place: Yes  Type of devices: Left in chair;Nurse notified;Call light within reach; Chair alarm in place           Patient Diagnosis(es): The encounter diagnosis was Post-op pain. has a past medical history of Abnormal Pap smear and Depression. has a past surgical history that includes Tubal ligation;  section; Colposcopy; Hysterectomy (11); Ankle surgery (Right, 2022); Gastrocnemius Recession (Right, 2022); and Foot surgery (Right, 2022). Treatment Diagnosis: decreased ADLs and transfers secondary to OA      Restrictions  Position Activity Restriction  Other position/activity restrictions: Nonweight bearing to right LE    Subjective   General  Additional Pertinent Hx: Pt admitted for RIGHT SUBTALAR JOINT ARTHRODESIS, THOMPSON PROCEDURE, TALAR NAVICULAR CUNEIFORM ARTHRODESIS, CALCANEAL OSTEOTOMY, MONDRAGON PROCEDURE, OPEN REDUCTION AND INTERNAL FIXATION (Right )    RIGHT OPEN GASTROCNEMIUS RECESSION on . PMHx includes: Abnormal Pap smear and Depression. Family / Caregiver Present: No  Referring Practitioner: Mena Rice DPM  Diagnosis: secondary OA of R ankle  Subjective  Subjective: Pt semi supine in bed upon arrival, agreeable to OT eval and treat.   Vital Signs  Temp: 98.1 °F (36.7 °C)  Temp Source: Oral  Pulse: 71  Heart Rate Source: Monitor  Resp: 18  BP: 91/60  BP Location: Right upper arm  Patient Position: Up in chair;Sitting  Level of Consciousness: Alert (0)  MEWS Score: 2  Oxygen Therapy  SpO2: 94 %  Pulse Oximeter Device Mode: Intermittent  Pulse Oximeter Device Location: Right;Finger  O2 Device: None (Room air)  Social/Functional History  Social/Functional History  Lives With: Alone  Type of Home:  (mobile home)  Home Access: Stairs to enter without rails  Entrance Stairs - Number of Steps: 4  Bathroom Shower/Tub: Walk-in shower  Bathroom Toilet: Standard (sink to push up from)  [employer]: Shower chair,Grab bars in shower  Home Equipment:  (no oDME)  Receives Help From:  (boyfriend can assist)  ADL Assistance: Independent  Homemaking Assistance: Independent  Homemaking Responsibilities: Yes  Ambulation Assistance: Independent  Transfer Assistance: Independent  Active : Yes  Patient's  Info: boyfriend will drive for now  Occupation: Full time employment  Type of occupation: work at [employer]  Additional Comments: will be going to boyfriends who has a few GALLO with railing, everything on first floor. Boyfriend will be able to assist as needed. 2 falls due to ankle giving out in past few months       Objective        Orientation  Overall Orientation Status: Within Functional Limits     Balance  Sitting Balance: Supervision  Standing Balance: Contact guard assistance (to SBA)  Standing Balance  Time: 10 mins total  Activity: mobiltiy into bathroom, up 1 step, standing at sink for grooming  Functional Mobility  Functional - Mobility Device: Rolling Walker  Activity: To/from bathroom  Assist Level: Contact guard assistance  Functional Mobility Comments: effortful during hopping/ mobiltiy into bathroom. Initially difficulty lifting LE to hop, ++ cueing and pt required CGA for hopping into bathroom. Difficulty with turning/ managing RW in bathroom, requiring at times assist at [facility]. Pt attempted hopping up steps, using B HR however pt required max A x 2 to safely hop up onto one step however was unable to further hop.   Toilet Transfers  Toilet - Technique: Ambulating  Equipment Used: Standard toilet (+ use of grab bar on L)  Toilet Transfer: Minimal assistance  Toilet Transfers Comments: ++ cueing for kicking R LE forward prior to standing/ sitting. Pt required use of grab bar on L which pt does not have at home  ADL  Grooming: Contact guard assistance (standing at sink for oral care, hand hygiene; progressing to SBA)  LE Dressing: Minimal assistance (assist to thread LEs into brief while seated on toilet)  Toileting: Minimal assistance (assist for pants mgmt)        Bed mobility  Supine to Sit: Stand by assistance  Scooting: Stand by assistance  Transfers  Sit to stand: Contact guard assistance  Stand to sit: Contact guard assistance  Transfer Comments: ++ cueing for kicking R LE forward prior to standing/ sitting     Cognition  Overall Cognitive Status: WFL                 LUE AROM (degrees)  LUE AROM : WFL  Left Hand AROM (degrees)  Left Hand AROM: WFL  RUE AROM (degrees)  RUE AROM : WFL  Right Hand AROM (degrees)  Right Hand AROM: WFL  LUE Strength  LUE Strength Comment: B UEs grossly demonstrating 3+/5 at shoulders; 4+/5 bicep strength  RUE Strength  RUE Strength Comment: B UEs grossly demonstrating 3+/5 at shoulders; 4+/5 bicep strength          Treatment included functional transfer training, ADLs, and patient education.             Plan   Plan  Times per week: 5-7  Times per day: Daily  Current Treatment Recommendations: Cyndi Hernandez Mobility Training,Endurance Training,Self-Care / Haven Crisostomo Re-education,Equipment Evaluation, Education, & procurement    AM-PAC Score        AM-[facility] Inpatient Daily Activity Raw Score: 17 (01/28/22 1144)  AM-PAC Inpatient ADL T-Scale Score : 37.26 (01/28/22 1144)  ADL Inpatient CMS 0-100% Score: 50.11 (01/28/22 1144)  ADL Inpatient CMS G-Code Modifier : CK (01/28/22 1144)    Goals  Short term goals  Time Frame for Short term goals: by dc  Short term goal 1: Pt will complete LB dressing with SBA  Short term goal 2: Pt will complete standing level grooming with good NWB precautions with spv  Short term goal 3: Pt will complete B UE chair pushups x 15 reps for improved strength for transfers/ mobility  Short term goal 4: Pt will complete toileting with SBA  Patient Goals   Patient goals : to go home       Therapy Time   Individual Concurrent Group Co-treatment   Time In 1015         Time Out 1108         Minutes 53         Timed Code Treatment Minutes: 38 Minutes (+15 min eval)    Analia CARDENAS  [facility], OTR/L [phone]

## 2022-01-28 NOTE — PLAN OF CARE
Problem: Falls - Risk of:  Goal: Will remain free from falls  Description: Will remain free from falls  Outcome: Ongoing  Note: High fall risk. Non-weight bearing to RLE. Fall precautions in place. Non-skid sock on left foot. Bed locked in lowest position with alarm on and 2/4 side rails up. Bedside table, belongings, and call light within reach. Patient calling out appropriately when needing assistance. Hourly rounding in anticipation of patient needs. Floor clean and free from clutter. Room door open. Will continue to monitor. Problem: Pain:  Goal: Pain level will decrease  Description: Pain level will decrease  Outcome: Ongoing  Note: Endorsing decreased sensation to RLE d/t block still being in place. C/o of some pain to right ankle and foot. Using numerical pain rating scale appropriately. PRN oxy administered per the [facility]. Patient educated on medications, side effects, and verbalized understanding. RLE elevated off of bed surface on pillows. Assisted with repositioning in bed/chair for comfort. Notified of pain medication administration schedule. Will continue to monitor and reassess. Problem: Skin Integrity:  Goal: Will show no infection signs and symptoms  Description: Will show no infection signs and symptoms  Outcome: Ongoing  Note: RLE remains in a cast, CDI. No drainage or odor noted. Patient is afebrile. Will continue to monitor.

## 2022-01-29 NOTE — OP NOTE
[address] [facility]               [address]                                OPERATIVE REPORT    PATIENT NAME: Jenny Carty                   :        1955  MED REC NO:   [id number]                          ROOM:       5516  ACCOUNT NO:   [de-identified]                           ADMIT DATE: 2022  PROVIDER:     Jasmyn Dotson DPM    DATE OF PROCEDURE:  2022    SURGEON:  Jasmyn Dotson DPM    ASSISTANT:  Mikael Sandhoff, DPM    PREOPERATIVE DIAGNOSES:  1. History of osteoarthritis, right foot specifically hindfoot,  midfoot. 2.  Posterior tibial tendinopathy and tendinitis with posterior tibialis  tendon dysfunction, right limb. 3.  Tightness of gastroc soleus flexor complex, right limb. 4.  Osseous defect tarsal bones with subluxation, mid foot right. POSTOPERATIVE DIAGNOSES:  1. History of osteoarthritis, right foot specifically hindfoot,  midfoot. 2.  Posterior tibial tendinopathy and tendinitis with posterior tibialis  tendon dysfunction, right limb. 3.  Tightness of gastroc soleus flexor complex, right limb. 4.  Osseous defect tarsal bones with subluxation, mid foot right. OPERATIONS PERFORMED:  1. Resection of tarsal bone and osseous defect, right. 2.  Arthrodesis subtalar joint, right. 3.  Arthrodesis metatarsal joint, right. 4.  Open gastroc recession, right. INJECTABLES:  30 mL of 0.5% Marcaine plain, preop popliteal and  saphenous block, intra and postop 10 more mL of 0.5% Marcaine plain. MATERIALS:  3-0 Vicryl, 3-0 nylon, Arthrex staples 20 x 20 x2, 25 x 20  x1, 18 x 15 x2, 7.0 cannulated headless screw. HEMOSTASIS:  Anatomic dissection with pneumatic thigh tourniquet on the  right 300 mmHg for 87 minutes. ESTIMATED BLOOD LOSS:  Less than 50. COMPLICATIONS:  None. SPECIMENS:  No specimens sent. DRAINS:  No drains. CONSENT:  A female consented for surgical intervention. Risks,  complications, and benefits were described to the patient. No  guarantees were given in light of the patient's comorbidities. She  understands, agrees, and elects to proceed with surgery. INDICATIONS FOR SURGERY:  Longstanding flexible flatfoot deformity with  kinetic severe pronation pain in the midfoot, hindfoot; also worsening  symptoms of osteoarthritic changes and hypertrophic osseous defect  formalities with formation in the midfoot, hindfoot region of the right. Failing all conservative modalities of care clinically including, but  not limited to reduction of activities because she used custom inserts,  bracing, casting, CAM booting, medications, change of job with  restrictions, the patient still fails to achieve improvement and will  need surgical intervention for correction. She understands and elected  to proceed with surgery. DESCRIPTION OF PROCEDURE:  This female was brought in from the  preoperative holding area, placed on the operating table in the supine  position. After IV sedation was induced, local anesthetic was  administered to the patient's right foot consisting of a preop popliteal  block 30 mL of 0.5% lidocaine plain. Right limb was scrubbed, prepped,  and draped in the usual sterile manner. Preparation was made for  procedure. RESECTION OF TARSAL BONE, OSSEOUS DEFECT, OSTEOPHYTES, RIGHT FOOT:   Attention was directed to the right foot where a linear incision was  created over the talonavicular region. Dissection was carried down over  this area down to periosteal capsular covering. Reflecting these areas  showed a noticeable an immediate osseous defect osteophytes of the  talonavicular junction with significant arthritis in this area. We  resected all these areas of bone and abnormalities and rasp down to  smooth anatomic contour.   We were able to take care and only have to  reflect roughly one-fifth of the posterior tibial tendon of the  insertion of navicular, so Attention was directed to the  right foot, where the talonavicular joint will be addressed. We removed  all cartilaginous surfaces firstly from our dissection previously,  specifically talonavicular region with reciprocating rasp. All  cartilage was removed atraumatically and then drill hole and fishtailing  this areas. Subsequently also in the calcaneocuboid joint,  cartilaginous surfaces were rasped, removed, and drill hole and  fishtailing were also performed to the medulla on either side. And then  additionally in the subtalar joint, we performed resection of  cartilaginous surfaces in the middle posterior facets along with  drilling and fishtailing in these regions with good healthy medullary  bleeding. Now going back to the talonavicular joint, we then held this  area in place. We also used DBM material along with bone marrow  aspirate and PRP. We obtained bone marrow aspirate from the tibia. This was performed preoperatively prior to inflation of the tourniquet. We atraumatically obtained from the metaphyseal portion of the tibia and  spun this down for pure bone marrow concentrate, mixed this with  peripheral blood taken again once the patient was on the table by  Anesthesia and rep. This was spun down and we combined both BMAC and  PRP with DBM material.  This material was then also utilized and placed  within the TN joint, CC joint, subtalar joint prior to fusion sites. Once this was in the joint, and joints of course were prepped, we then  temporarily positioned the area with a 0.062 K-wire, confirmed by  fluoroscopy and then under AO fixation principles used three staples, 25  x 20, 20 x 20, 20 x 20 all in correlation and planes in the TN joint,  all confirmed by fluoroscopy. Next, going to the calcaneocuboid joint  after this was aligned, we were able to appose this with reduction and  positioning of this area.   We did not perform much in terms of  lengthening the CC joint, so we are actually to articulate and oppose  these perfectly. Again, joints were already prepped. Placement of  BMAC, PRP, DBM material within the area to offer better healing and then  we also performed fusion arthrodesis of this side as well with two  staples 18 x 15 x2. However, this was done third so it is worth to note  that this was the last joint fused. The order of sequence fusions was talonavicular for a subtalar joint  second and then CC joint third. SUBTALAR JOINT ARTHRODESIS, RIGHT FOOT. Attention was directed to the  right foot, where after TN joint was fused and in good position, we then  allowed for better reduction of the talocalcaneal angle and then held a  0.062 K-wire in this position as well. Placing the 7-0 cannulated pin  up the posterior from posterior to anterior, we placed this within the  calcaneus off the weightbearing surface, however, traversing midline  between the middle and posterior facets. We then using AO fixation  principles, inserted 7-mm headless screw and obtained excellent  compression of this joint region as well. The area was then flushed  copiously with irrigant solution and we closed this incision with 3-0  nylon on the heel. Finally moving to the CC joint, we then again as above stated performed  the third fusion and sequential elements of third fusion, we then  performed fusion of the CC joint as above stated and described as a  calcaneocuboid joint. After all areas were flushed with sterile saline,  we dropped tourniquet for good hemostasis. Then, we closed deep layers  with 3-0 Vicryl and again attention was directed to the posterior tib  tendon to further repair this as well back to the capsular component  which was done. We decided we did not need anchoring in this area  because it was well coapted back to anatomic physiologic tension length  and then deep layers with subcu 3-0 Vicryl and skin 3-0 nylon.   Again 10  more mL of Marcaine was injected into the posterior and plantar heel  along with the proximal incision sites on the foot. Dressings, Betadine  ointment, gauze, Kerlix, Erin and then we performed multiple layers for  cast material application. In the final procedure, application of below-the-knee, nonweightbearing  total contact cast.  Multiple layers of Webril stockinette in 3 and 4  inch fiberglass rolls were applied to the leg, having the foot roughly  at 5 to 10 degrees of plantar flexion to easily tension off the gastroc  soleus complex. This was performed without event. The patient  tolerated the procedure and anesthesia well. The patient was  transferred to the recovery room. Vital signs were stable. Vascular  supply intact to the right limb. The patient will be admitted 24-hour  overnight stay for medical observation for specifically medical and  pain.           Heath Morris DPM    D: 01/28/2022 9:44:18       T: 01/28/2022 22:26:26     [email or web address]  Job#: [id number]     Doc#: [id number]    CC:

## 2025-05-28 ENCOUNTER — HOSPITAL ENCOUNTER (OUTPATIENT)
Dept: MAMMOGRAPHY | Age: 70
Discharge: HOME | End: 2025-05-28
Payer: MEDICARE

## 2025-05-28 VITALS — BODY MASS INDEX: 30.9 KG/M2 | HEIGHT: 64 IN | WEIGHT: 181 LBS

## 2025-05-28 DIAGNOSIS — Z12.31 OTHER SCREENING MAMMOGRAM: ICD-10-CM

## 2025-05-28 PROCEDURE — 77063 BREAST TOMOSYNTHESIS BI: CPT

## (undated) DEVICE — STANDARD HYPODERMIC NEEDLE,POLYPROPYLENE HUB: Brand: MONOJECT

## (undated) DEVICE — COVER LT HNDL BLU PLAS

## (undated) DEVICE — PLATELET CONCENTRATION KIT ERYTHROCYTE MAR STRL DISP

## (undated) DEVICE — TUNNELER INFUS 16GA L12IN SHTH DISP ON-Q

## (undated) DEVICE — C-ARM: Brand: UNBRANDED

## (undated) DEVICE — SOLUTION IRRIG 3000ML 0.9% SOD CHL USP UROMATIC PLAS CONT

## (undated) DEVICE — PAD,ABDOMINAL,5"X9",ST,LF,25/BX: Brand: MEDLINE INDUSTRIES, INC.

## (undated) DEVICE — GUIDEWIRE SURG L9.25IN DIA0.092IN W/ TRCR TIP

## (undated) DEVICE — SUTURE ETHLN SZ 2-0 L18IN NONABSORBABLE BLK L26MM FS 3/8 664G

## (undated) DEVICE — NEEDLE HYPO 22GA L1.5IN BLK POLYPR HUB S STL REG BVL STR

## (undated) DEVICE — Device

## (undated) DEVICE — GLOVE SURG SZ 85 STD WHT LTX SYN POLYMER BEAD REINF ANTI RL

## (undated) DEVICE — 60 ML SYRINGE LUER-LOCK TIP: Brand: MONOJECT

## (undated) DEVICE — SUTURE VCRL SZ 3-0 L27IN ABSRB UD L26MM CT-2 1/2 CIR J232H

## (undated) DEVICE — SUTURE VCRL SZ 3-0 L18IN ABSRB UD PS-2 L19MM 1/2 CIR J497G

## (undated) DEVICE — E-Z CLEAN, NON-STICK, PTFE COATED, ELECTROSURGICAL BLADE ELECTRODE, 2.5 INCH (6.35 CM): Brand: EZ CLEAN

## (undated) DEVICE — GOWN,SIRUS,POLYRNF,BRTHSLV,XL,30/CS: Brand: MEDLINE

## (undated) DEVICE — SYRINGE MED 30ML STD CLR PLAS LUERLOCK TIP N CTRL DISP

## (undated) DEVICE — SUTURE VCRL SZ 4-0 L18IN ABSRB UD POLYGLACTIN 910 BRAID TIE J643H

## (undated) DEVICE — GLOVE ORTHO 8   MSG9480

## (undated) DEVICE — COAXIAL HIGH FLOW TIP WITH SOFT SHIELD

## (undated) DEVICE — BLANKET WRM W29.9XL79.1IN UP BODY FORC AIR MISTRAL-AIR

## (undated) DEVICE — ANTI EM TH L-LO W 1 EN FR ES: Brand: JOBST ANTI-EM

## (undated) DEVICE — SUTURE ETHLN SZ 3-0 L18IN NONABSORBABLE BLK PS-2 L19MM 3/8 1669H

## (undated) DEVICE — LOOP,VESSEL,MAXI,BLUE,2/PK,STERILE: Brand: MEDLINE

## (undated) DEVICE — PODIATRY: Brand: MEDLINE INDUSTRIES, INC.

## (undated) DEVICE — FOOT SWITCH DRAPE: Brand: UNBRANDED

## (undated) DEVICE — TOWEL,OR,DSP,ST,BLUE,DLX,8/PK,10PK/CS: Brand: MEDLINE

## (undated) DEVICE — GARMENT,MEDLINE,DVT,INT,CALF,LG, GEN2: Brand: MEDLINE

## (undated) DEVICE — GEL US 20GM NONIRRITATING OVERWRAPPED FILE PCH TRNSMIT

## (undated) DEVICE — SUTURE VCRL SZ 3-0 L27IN ABSRB UD L26MM SH 1/2 CIR J416H

## (undated) DEVICE — SST TWIST DRILL, STANDARD, 3.6MM DIA. X 127MM: Brand: MICROAIRE®

## (undated) DEVICE — SOLUTION IV 1000ML 0.9% SOD CHL

## (undated) DEVICE — SUTURE COAT VCRL SZ 4-0 L18IN ABSRB UD L19MM PS-2 1/2 CIR J496G

## (undated) DEVICE — SUTURE NONABSORBABLE MONOFILAMENT 4-0 PS-2 18 IN BLK ETHILON 1667G

## (undated) DEVICE — TOWEL,STOP FLAG GOLD N-W: Brand: MEDLINE

## (undated) DEVICE — MICRO SAGITTAL BLADE (9.4 X 0.4 X 26.2MM)

## (undated) DEVICE — STRIP,CLOSURE,WOUND,MEDI-STRIP,1/2X4: Brand: MEDLINE

## (undated) DEVICE — DUAL CUT SAGITTAL BLADE

## (undated) DEVICE — SUTURE VCRL SZ 4-0 L18IN ABSRB UD L19MM PS-2 3/8 CIR PRIM J496H

## (undated) DEVICE — PRECISION SPECIMEN CONTAINER 4 OZ (118 ML) • POSITIVE SEAL INDICATOR OR PACKAGED: Brand: PRECISION

## (undated) DEVICE — GLOVE SURG SZ 8 L12IN FNGR THK79MIL GRN LTX FREE

## (undated) DEVICE — BIT DRL DIA5MM STR CANN DISP

## (undated) DEVICE — DRESSING PETRO W3XL8IN N ADH KNIT CELOS ACETT ADPTC

## (undated) DEVICE — SUTURE VCRL SZ 2-0 L27IN ABSRB UD L26MM SH 1/2 CIR J417H

## (undated) DEVICE — SYRINGE MED 10ML LUERLOCK TIP W/O SFTY DISP